# Patient Record
Sex: FEMALE | Race: WHITE | NOT HISPANIC OR LATINO | Employment: UNEMPLOYED | URBAN - METROPOLITAN AREA
[De-identification: names, ages, dates, MRNs, and addresses within clinical notes are randomized per-mention and may not be internally consistent; named-entity substitution may affect disease eponyms.]

---

## 2019-03-11 ENCOUNTER — OFFICE VISIT CONVERTED (OUTPATIENT)
Dept: FAMILY MEDICINE CLINIC | Facility: CLINIC | Age: 24
End: 2019-03-11
Attending: NURSE PRACTITIONER

## 2020-05-11 ENCOUNTER — HOSPITAL ENCOUNTER (OUTPATIENT)
Dept: FAMILY MEDICINE CLINIC | Facility: CLINIC | Age: 25
Discharge: HOME | End: 2020-05-11
Attending: NURSE PRACTITIONER

## 2020-05-11 ENCOUNTER — OFFICE VISIT CONVERTED (OUTPATIENT)
Dept: FAMILY MEDICINE CLINIC | Facility: CLINIC | Age: 25
End: 2020-05-11
Attending: NURSE PRACTITIONER

## 2021-05-13 NOTE — PROGRESS NOTES
Progress Note      Patient Name: luisito bailey   Patient ID: [id number]   Sex: Female   YOB: 1995    Primary Care Provider: Pranay Chapa DO    Visit Date: May 11, 2020    Provider: JUAN Hunter   Location: [facility]   Location Address: [address] [place] [address], [address] [place] [address]  [id number]   Location Phone: [phone]          Chief Complaint  · follow up on birth control  · Annual Exam  · PAP exam  · (Health Maintainence Information Reviewed Under Results)      History Of Present Illness  luisito bailey is a 24 year old /White female who presents for evaluation and treatment of:   Last PAP Smear: Never.   Date of Last Mammogram: N/A.   Date of Last Colonoscopy: N/A   No current complaints.      She is here today for follow-up and refill on oral contraceptive pills.  She has never had a Pap smear and is willing to do Pap today.  States her last menstrual period was about 2 weeks ago.  She denies ever being sexually active.  States she only takes oral contraceptives for heavy menses.       Past Medical History  Disease Name Date Onset Notes   Allergies --  --    Anxiety --  --          Past Surgical History  Procedure Name Date Notes   Cyst Removal --  --    Flanagan Tooth Extraction --  --          Medication List  Name Date Started Instructions   Loestrin Fe 1/20 (28-Day) 1 mg-20 mcg (21)/75 mg (7) oral tablet 05/11/2020 take 1 tablet by oral route once daily for 28 days         Allergy List  Allergen Name Date Reaction Notes   Bactrim --  --  --    PENICILLINS --  --  --    Septra --  --  --          Family Medical History  Disease Name Relative/Age Notes   Stroke  --    Heart Disease  --    Kidney Disease  --    Diabetes  --          Social History  Finding Status Start/Stop Quantity Notes   Tobacco Never --/-- --  --          Review of Systems  · Constitutional  o Denies  o : fever, fatigue, weight loss, weight gain  · Cardiovascular  o Denies  o : lower extremity  "edema, claudication, chest pressure, palpitations  · Respiratory  o Denies  o : shortness of breath, wheezing, cough, hemoptysis, dyspnea on exertion  · Gastrointestinal  o Denies  o : nausea, vomiting, diarrhea, constipation, abdominal pain  · Genitourinary  o Admits  o : vaginal discharge  o Denies  o : urgency, frequency, dysuria, possible pregnancy  · Psychiatric  o Denies  o : anxiety, depression, suicidal ideation, homicidal ideation      Vitals  Date Time BP Position Site L\R Cuff Size HR RR TEMP (F) WT  HT  BMI kg/m2 BSA m2 O2 Sat        05/11/2020 08:26 /60 Sitting    98 - R  98.2 184lbs 16oz 5'  9\" 27.32 2.02 97 %          Physical Examination  · Constitutional  o Appearance  o : well-nourished, in no acute distress  · Neck  o Inspection/Palpation  o : normal appearance, no masses or tenderness, trachea midline  o Thyroid  o : gland size normal, nontender, no nodules or masses present on palpation  · Respiratory  o Respiratory Effort  o : breathing unlabored  o Inspection of Chest  o : normal appearance  o Auscultation of Lungs  o : normal breath sounds throughout  · Cardiovascular  o Heart  o :   § Auscultation of Heart  § : regular rate and rhythm, no murmurs, gallops or rubs  · Breasts  o Inspection of Breasts  o : breasts symmetrical, no skin changes, no deformities present, no discharge present  o Palpation of Breasts, Axillae  o : no masses present on palpation, no breast tenderness  · Genitourinary  o External Genitalia  o : no inflammation, no lesions present  o Vagina  o : normal vaginal vault, white-colored discharge present, no inflammatory lesions present, no masses present, no evidence of trauma  o Cervix  o : cervix not visualized, patient unable to tolerate speculum and could not expand speculum  o Perineum  o : perineum within normal limits  · Lymphatic  o Neck  o : no lymphadenopathy present  o Axilla  o : no lymphadenopathy present  · Neurologic  o Mental Status Examination  o : "   § Orientation  § : grossly oriented to person, place and time  o Gait and Station  o : normal gait, able to stand without difficulty  · Psychiatric  o Judgement and Insight  o : judgment and insight intact  o Mood and Affect  o : mood normal, affect appropriate          Assessment  · Encounter for contraceptive management     V25.9/Z30.9  · Vaginal Discharge     623.5/N89.8  · Vaginal Itching     698.1/L29.8    Problems Reconciled  Plan  · Orders  o Vaginal Screen (Candida, Gardnerella, Trichomonas) (47472) - 623.5/N89.8 - 05/11/2020  o ACO-39: Current medications updated and reviewed () - - 05/11/2020  o ACO-18: Negative screen for clinical depression using a standardized tool () - - 05/11/2020   negative 4 pts.  o ACO-14: Influenza immunization administered or previously received () - - 05/11/2020   10/2019  · Medications  o Loestrin Fe 1/20 (28-Day) 1 mg-20 mcg (21)/75 mg (7) oral tablet   SIG: take 1 tablet by oral route once daily for 28 days   DISP: (28) tablets with 11 refills  Adjusted on 05/11/2020     o Medications have been Reconciled  o Transition of Care or Provider Policy  · Instructions  o Birth control pills need to be taken at the same time daily. Vomiting, diarrhea, antibiotics and seizure medication make these pills less effective. If any of these happen during a pack use other form of birth control until start a new pack. Break through bleeding is any bleeding during the active pills in the pack. If this occurs use another form of birth control. If you miss a pill or take one late use another form of birth control until you start a new pack. Abdominal Pain, Chest pain, headaches (not relieved by Tylenol), Leg pain or vision changes need to be reported immediately to your provider.   o Patient was educated/instructed on their diagnosis, treatment and medications prior to discharge from the clinic today.  o Patient instructed to seek medical attention urgently for new or worsening  symptoms.  o Call the office with any concerns or questions.  o Counseled on monthly breast self exams.   · Disposition  o Call or Return if symptoms worsen or persist.  o Annual wellness exam in one year     Unable to obtain Pap smear today due to patient complaining of pain with speculum insertion, unable to expand speculum.  She did have some vaginal discharge so a vaginal swab was obtained.             Electronically Signed by: JUNA Hunter -Author on May 11, 2020 10:55:25 AM

## 2021-05-15 VITALS
HEART RATE: 98 BPM | DIASTOLIC BLOOD PRESSURE: 60 MMHG | WEIGHT: 185 LBS | TEMPERATURE: 98.2 F | OXYGEN SATURATION: 97 % | BODY MASS INDEX: 27.4 KG/M2 | HEIGHT: 69 IN | SYSTOLIC BLOOD PRESSURE: 104 MMHG

## 2021-05-15 VITALS
DIASTOLIC BLOOD PRESSURE: 60 MMHG | OXYGEN SATURATION: 96 % | SYSTOLIC BLOOD PRESSURE: 110 MMHG | HEIGHT: 69 IN | WEIGHT: 185.25 LBS | HEART RATE: 86 BPM | BODY MASS INDEX: 27.44 KG/M2

## 2021-11-01 ENCOUNTER — OFFICE VISIT (OUTPATIENT)
Dept: FAMILY MEDICINE CLINIC | Facility: CLINIC | Age: 26
End: 2021-11-01

## 2021-11-01 VITALS
TEMPERATURE: 99.2 F | BODY MASS INDEX: 27.61 KG/M2 | WEIGHT: 186.4 LBS | OXYGEN SATURATION: 99 % | HEIGHT: 69 IN | DIASTOLIC BLOOD PRESSURE: 62 MMHG | HEART RATE: 94 BPM | SYSTOLIC BLOOD PRESSURE: 120 MMHG

## 2021-11-01 DIAGNOSIS — F41.9 ANXIETY: Primary | ICD-10-CM

## 2021-11-01 DIAGNOSIS — Z23 ENCOUNTER FOR VACCINATION: ICD-10-CM

## 2021-11-01 PROCEDURE — 90471 IMMUNIZATION ADMIN: CPT | Performed by: NURSE PRACTITIONER

## 2021-11-01 PROCEDURE — 99213 OFFICE O/P EST LOW 20 MIN: CPT | Performed by: NURSE PRACTITIONER

## 2021-11-01 PROCEDURE — 90686 IIV4 VACC NO PRSV 0.5 ML IM: CPT | Performed by: NURSE PRACTITIONER

## 2021-11-01 RX ORDER — NORETHINDRONE ACETATE AND ETHINYL ESTRADIOL AND FERROUS FUMARATE 1MG-20(21)
1 KIT ORAL DAILY
COMMUNITY
Start: 2021-10-11 | End: 2021-11-24

## 2021-11-01 NOTE — PROGRESS NOTES
"Chief Complaint  vaccination concerns    Subjective          Orly Hartmann presents to [facility]  History of Present Illness  She is here to discuss her vaccination concerns.  She is going to start a job as a  and they are requiring her to have the flu vaccine, the Covid vaccine and hepatitis A vaccines.  She has some general concerns about vaccines.  She is not sure if she has had a hepatitis A vaccine or not.  She states she does have her immunization record at home.  She would like to go ahead and get the flu vaccine today.    She has a history of anxiety.  She is noted to be picking at her nails the entire time during her visit.  She has never been treated for anxiety and has never seen a therapist.  She states her anxiety is \"ramped up\" recently.  She states she has thought about seeing a therapist.  Objective   Vital Signs:   /62   Pulse 94   Temp 99.2 °F (37.3 °C)   Ht 175.3 cm (69\")   Wt 84.6 kg (186 lb 6.4 oz)   SpO2 99%   BMI 27.53 kg/m²     Physical Exam  Vitals reviewed.   Constitutional:       Appearance: Normal appearance. She is well-developed.   Neck:      Thyroid: No thyroid mass, thyromegaly or thyroid tenderness.   Cardiovascular:      Rate and Rhythm: Normal rate and regular rhythm.      Heart sounds: No murmur heard.  No friction rub. No gallop.    Pulmonary:      Effort: Pulmonary effort is normal.      Breath sounds: Normal breath sounds. No wheezing or rhonchi.   Lymphadenopathy:      Cervical: No cervical adenopathy.   Skin:     General: Skin is warm and dry.   Neurological:      Mental Status: She is alert and oriented to person, place, and time.      Cranial Nerves: No cranial nerve deficit.   Psychiatric:         Mood and Affect: Mood and affect normal.         Behavior: Behavior normal.         Thought Content: Thought content normal. Thought content does not include homicidal or suicidal ideation.         Judgment: Judgment normal. "      Comments: Patient picking at nails during visit.  Eye contact is rare during visit.        Result Review :                 Assessment and Plan    Diagnoses and all orders for this visit:    1. Anxiety (Primary)  Assessment & Plan:  Anxiety worse recently.  Patient declines any interventions.  Recommend to see a therapist.      2. Encounter for vaccination  Comments:  Questions answered about vaccinations, written information provided, see AVS.  Patient received flu vaccine today.    Other orders  -     FluLaval/Fluarix/Fluzone >6 Months ([id number])      Follow Up   Return if symptoms worsen or fail to improve.  Patient was given instructions and counseling regarding her condition or for health maintenance advice. Please see specific information pulled into the AVS if appropriate.

## 2021-11-01 NOTE — PATIENT INSTRUCTIONS
Immunization Schedule, 22-26 Years Old    Vaccines are usually given at various ages, according to a schedule. Your health care provider will recommend vaccines for you based on your age, medical history, and lifestyle or other factors, such as travel or where you work.  You may receive vaccines as individual doses or as more than one vaccine together in one shot (combination vaccine). Talk with your health care provider about the risks and benefits of combination vaccines.  Recommended immunizations for 22-26 years old  Influenza vaccine  · You should get a dose of the influenza vaccine every year.  Tetanus, diphtheria, and pertussis vaccine  A vaccine that protects against tetanus, diphtheria, and pertussis is known as the Tdap vaccine. A vaccine that protects against tetanus and diphtheria is known as the Td vaccine.  · You should only get the Td vaccine if you have had at least 1 dose of the Tdap vaccine.  · You should get 1 dose of the Td or Tdap vaccine every 10 years, or you should get 1 dose of the Tdap vaccine if:  ? You have not previously gotten a Tdap vaccine.  ? You do not know if you have ever gotten a Tdap vaccine.  ? You are between 27 and 36 weeks pregnant.  Measles, mumps, and rubella vaccine  This is also known as the MMR vaccine. You may need to get the MMR vaccine if:  · You need to catch up on doses you missed in the past.  · You have not been given the vaccine before.  · You do not have evidence of immunity (by a blood test).  Pregnant women should not get the MMR vaccine during pregnancy because it may be harmful to the unborn baby. However, if you are not immune to measles, mumps, or rubella, you should get a dose of MMR vaccine one month or more before pregnancy or within days after delivery.  Varicella vaccine  This is also known as the NELLI vaccine. You may need to get the NELLI vaccine if:  · You need to catch up on doses you missed in the past.  · You have not been given the vaccine  before.  · You do not have evidence of immunity (by a blood test).  · You have certain high-risk conditions, such as HIV or AIDS.  Pregnant women should not get the NELLI vaccine during pregnancy because it may be harmful to the unborn baby. However, if you are not immune to chickenpox (varicella), you should get a dose of the NELLI vaccine within days after delivery.  Human papillomavirus vaccine  This is also known as the HPV vaccine. You should get the HPV vaccine if:  · You have not gotten the vaccine before. The vaccine is recommended for all adults through age 26.  · You need to catch up on doses you missed in the past.  Pneumococcal conjugate vaccine  This is also known as the PCV13 vaccine. You should get the PCV13 vaccine as recommended if you have certain high-risk conditions. These include:  · Diabetes.  · Chronic conditions of the heart, lungs, or liver.  · Conditions that affect the body's disease-fighting system (immune system).  Pneumococcal polysaccharide vaccine  This is also known as the PPSV23 vaccine. You should get the PPSV23 vaccine as recommended if you have certain high-risk conditions. These include:  · Diabetes.  · Chronic conditions of the heart, lungs, or liver.  · Conditions that affect the immune system.  Hepatitis A vaccine  This is also known as the HepA vaccine. If you did not get the HepA vaccine previously, you should get it if:  · You are at risk for a hepatitis A infection. You may be at risk for infection if you:  ? Have chronic liver disease.  ? Have HIV or AIDS.  ? Are a man who has sex with men.  ? Use drugs.  ? Are homeless.  ? May be exposed to hepatitis A through work.  ? Travel to countries where hepatitis A is common.  ? Are pregnant.  ? Have or will have close contact with someone who was adopted from another country.  · You are not at risk for infection but want protection from hepatitis A.  Hepatitis B vaccine  This is also known as the HepB vaccine. If you did not get  the HepB vaccine previously, you should get it if:  · You are at risk for hepatitis B infection. You are at risk if you:  ? Have chronic liver disease.  ? Have HIV or AIDS.  ? Have sex with a partner who has hepatitis B, or:  § You have multiple sex partners.  § You are a man who has sex with men.  ? Use drugs.  ? May be exposed to hepatitis B through work.  ? Live with someone who has hepatitis B.  ? Receive dialysis treatment.  ? Have diabetes.  ? Travel to countries where hepatitis B is common.  ? Are pregnant.  · You are not at risk of infection but want protection from hepatitis B.  Meningococcal conjugate vaccine  This is also known as the MenACWY vaccine. You may need to get the MenACWY vaccine if you:  · Have not been given the vaccine before.  · Need to catch up on doses you missed in the past.  This vaccine is especially important if you:  · Do not have a spleen.  · Have sickle cell disease.  · Have HIV.  · Take medicines that suppress your immune system.  · Travel to countries where meningococcal disease is common.  · Are exposed to Neisseria meningitidis at work.  Serogroup B meningococcal vaccine  This is also known as the MenB vaccine. You may need to get the MenB vaccine if you:  · Have not been given the vaccine before.  · Need to catch up on doses you missed in the past.  This vaccine is especially important if you:  · Do not have a spleen.  · Have sickle cell disease.  · Take medicines that suppress your immune system.  · Are exposed to Neisseria meningitidis at work.  Haemophilus influenzae type b vaccine  This is also known as the Hib vaccine. Anyone older than 5 years of age is usually not given the Hib vaccine. However, if you have certain high-risk conditions, you may need to get this vaccine. These conditions include:  · Not having a spleen.  · Having received a stem cell transplant.  Before you get a vaccine:  Talk with your health care provider about which vaccines are right for you. This  is especially important if:  · You previously had a reaction after getting a vaccine.  · You have a weakened immune system. You may have a weakened immune system if you:  ? Are taking medicines that reduce (suppress) the activity of your immune system.  ? Are taking medicines to treat cancer (chemotherapy).  ? Have HIV or AIDS.  · You work in an environment where you may be exposed to a disease.  · You plan to travel outside of the country.  · You have a chronic illness, such as heart disease, kidney disease, diabetes, or lung disease.  · You are pregnant, think you may be pregnant, or are planning to become pregnant.  Summary  · Before you get a vaccine, tell your health care provider if you have reacted to vaccines in the past or have a condition that weakens your immune system.  · At 22-26 years, you should get a dose of the flu vaccine every year and a dose of the Td or Tdap vaccine every 10 years.  · Depending on your medical history and your risk factors, you may need other vaccines. Ask your health care provider whether you are up to date on all your vaccines.  · Women who are pregnant may not receive certain vaccines. Ask your health care provider whether you should receive any vaccines soon after you deliver your baby.  This information is not intended to replace advice given to you by your health care provider. Make sure you discuss any questions you have with your health care provider.  Document Revised: 10/13/2020 Document Reviewed: 10/13/2020  [employer] Patient Education © 2021 [employer]  Influenza (Flu) Vaccine (Inactivated or Recombinant): What You Need to Know  1. Why get vaccinated?  Influenza vaccine can prevent influenza (flu).  Flu is a contagious disease that spreads around the [place] every year, usually between October and May. Anyone can get the flu, but it is more dangerous for some people. Infants and young children, people 65 years of age and older, pregnant women, and people with  certain health conditions or a weakened immune system are at greatest risk of flu complications.  Pneumonia, bronchitis, sinus infections and ear infections are examples of flu-related complications. If you have a medical condition, such as heart disease, cancer or diabetes, flu can make it worse.  Flu can cause fever and chills, sore throat, muscle aches, fatigue, cough, headache, and runny or stuffy nose. Some people may have vomiting and diarrhea, though this is more common in children than adults.  Each year thousands of people in the [place] die from flu, and many more are hospitalized. Flu vaccine prevents millions of illnesses and flu-related visits to the doctor each year.  2. Influenza vaccine  CDC recommends everyone 6 months of age and older get vaccinated every flu season. Children 6 months through 8 years of age may need 2 doses during a single flu season. Everyone else needs only 1 dose each flu season.  It takes about 2 weeks for protection to develop after vaccination.  There are many flu viruses, and they are always changing. Each year a new flu vaccine is made to protect against three or four viruses that are likely to cause disease in the upcoming flu season. Even when the vaccine doesn't exactly match these viruses, it may still provide some protection.  Influenza vaccine does not cause flu.  Influenza vaccine may be given at the same time as other vaccines.  3. Talk with your health care provider  Tell your vaccine provider if the person getting the vaccine:  · Has had an allergic reaction after a previous dose of influenza vaccine, or has any severe, life-threatening allergies.  · Has ever had Guillain-Barré Syndrome (also called GBS).  In some cases, your health care provider may decide to postpone influenza vaccination to a future visit.  People with minor illnesses, such as a cold, may be vaccinated. People who are moderately or severely ill should usually wait until they recover  before getting influenza vaccine.  Your health care provider can give you more information.  4. Risks of a vaccine reaction  · Soreness, redness, and swelling where shot is given, fever, muscle aches, and headache can happen after influenza vaccine.  · There may be a very small increased risk of Guillain-Barré Syndrome (GBS) after inactivated influenza vaccine (the flu shot).  Young children who get the flu shot along with pneumococcal vaccine (PCV13), and/or DTaP vaccine at the same time might be slightly more likely to have a seizure caused by fever. Tell your health care provider if a child who is getting flu vaccine has ever had a seizure.  People sometimes faint after medical procedures, including vaccination. Tell your provider if you feel dizzy or have vision changes or ringing in the ears.  As with any medicine, there is a very remote chance of a vaccine causing a severe allergic reaction, other serious injury, or death.  5. What if there is a serious problem?  An allergic reaction could occur after the vaccinated person leaves the clinic. If you see signs of a severe allergic reaction (hives, swelling of the face and throat, difficulty breathing, a fast heartbeat, dizziness, or weakness), call 9-1-1 and get the person to the nearest hospital.  For other signs that concern you, call your health care provider.  Adverse reactions should be reported to the Vaccine Adverse Event Reporting System (VAERS). Your health care provider will usually file this report, or you can do it yourself. Visit the VAERS website at www.vaers.hhs.gov or call 1-879.334.9631. VAERS is only for reporting reactions, and VAERS staff do not give medical advice.  6. The National Vaccine Injury Compensation Program  The National Vaccine Injury Compensation Program (VICP) is a federal program that was created to compensate people who may have been injured by certain vaccines. Visit the VICP website at www.hrsa.gov/vaccinecompensation or  call 1-547.138.7797 to learn about the program and about filing a claim. There is a time limit to file a claim for compensation.  7. How can I learn more?  · Ask your healthcare provider.  · Call your local or state health department.  · Contact the Centers for Disease Control and Prevention (CDC):  ? Call 1-380.191.4929 (5-002-JTS-INFO) or  ? Visit CDC's www.cdc.gov/flu  Vaccine Information Statement (Interim) Inactivated Influenza Vaccine (8/15/2019)  This information is not intended to replace advice given to you by your health care provider. Make sure you discuss any questions you have with your health care provider.  Document Revised: 12/09/2020 Document Reviewed: 12/09/2020  Elsevier Patient Education © 2021 [employer]  Frequently Asked Questions About COVID-19 Vaccination  · Below are answers to commonly asked questions about COVID-19 vaccination.  · Bust myths and learn the facts about COVID-19 vaccines  If I have already had COVID-19 and recovered, do I still need to get vaccinated with a COVID-19 vaccine?  Yes, you should be vaccinated regardless of whether you already had COVID-19. That's because experts do not yet know how long you are protected from getting sick again after recovering from COVID-19. Even if you have already recovered from COVID-19, it is possible--although rare--that you could be infected with the virus that causes COVID-19 again. Studies have shown that vaccination provides a strong boost in protection in people who have recovered from COVID-19. Learn more about why getting vaccinated is a safer way to build protection than getting infected.  If you were treated for COVID-19 with monoclonal antibodies or convalescent plasma, you should wait 90 days before getting a COVID-19 vaccine. Talk to your doctor if you are unsure what treatments you received or if you have more questions about getting a COVID-19 vaccine.  If you or your child has a history of multisystem inflammatory syndrome  in adults or children (MIS-A or MIS-C), consider delaying vaccination until you or your child have recovered from being sick and for 90 days after the date of diagnosis of MIS-A or MIS-C. Learn more about the clinical considerations people with a history of multisystem MIS-C or MIS-A.  Experts are still learning more about how long vaccines protect against COVID-19. [facility] will keep the public informed as new evidence becomes available.  Related pages:  · Benefits of Getting Vaccinated  · Preparing for Your COVID-19 Vaccination  Is it safe for my child to get a COVID-19 vaccine?  Yes. Studies show that COVID-19 vaccines are safe and effective. Like adults, children may have some side effects after COVID-19 vaccination. These side effects may affect their ability to do daily activities, but they should go away in a few days. Children 12 years and older are now eligible to get vaccinated against COVID-19. COVID-19 vaccines have been used under the most intensive safety monitoring in [place] history, including studies in children 12 years and older. Your child cannot get COVID-19 from any COVID-19 vaccine.  Related page:  · COVID-19 Vaccines for Children and Teens  Why should my child get vaccinated against COVID-19?  COVID-19 vaccination can help protect your child from getting COVID-19. Although fewer children have been sick with COVID-19 compared to adults, children can be infected with the virus that causes COVID-19, can get sick from COVID-19, and can spread the virus that causes COVID-19 to others. Getting your child vaccinated helps to protect your child and your family. Vaccination is now recommended for everyone 12 years and older. Currently, the Pfizer-BioNTech COVID-19 Vaccine is the only one available to children 12 years and older.  Related page:  · COVID-19 Vaccines for Children and Teens  What are the ingredients in COVID-19 vaccines?  Vaccine ingredients can vary by . To learn more about the  ingredients in authorized COVID-19 vaccines, see  · Information about the Pfizer-BioNTech COVID-19 Vaccine  · Information about the Moderna COVID-19 Vaccine  · Information about the [employer]'s Pito COVID-19 Vaccine  · Ingredients Included in COVID-19 Vaccines  Do I need to wear a mask and avoid close contact with others if I am fully vaccinated?  No. Fully vaccinated people can resume activities without wearing a mask or physically distancing, except where required by federal, state, local, [place], or territorial laws, rules, and regulations, including local business and workplace guidance. If you are fully vaccinated, you can resume activities that you did before the pandemic.  Additional recommendations can be found at When You've Been Fully Vaccinated.  Related pages:  · When You've Been Fully Vaccinated  · Key Things to Know  · Protect Yourself and Others  Can I choose which COVID-19 vaccine I get?  Yes. All currently authorized and recommended COVID-19 vaccines are safe and effective, and CDC does not recommend one vaccine over another. The most important decision is to get a COVID-19 vaccination as soon as possible. Widespread vaccination is a critical tool to help stop the pandemic.  People should be aware that a risk of a rare condition called thrombosis with thrombocytopenia syndrome (TTS) has been reported following vaccination with the Znode/Pito COVID-19 Vaccine. TTS is a serious condition that involves blood clots with low platelet counts. This problem is rare, and most reports were in women between 18 and 49 years old. For women 50 years and older and men of any age, this problem is even more rare. There are other COVID-19 vaccine options available for which this risk has not been seen (Pfizer-BioNTech, Moderna).  Learn more about your COVID-19 vaccination, including how to find a vaccination location, what to expect at your appointment, and more.  Related page:  · Your Vaccination  If I  am pregnant, can I get a COVID-19 vaccine?  Yes, if you are pregnant, you can receive a COVID-19 vaccine.  You might want to have a conversation with your healthcare provider to help you decide whether to get vaccinated. While such a conversation might be helpful, it is not required before vaccination. Learn more about vaccination considerations for people who are pregnant or breastfeeding.  If you are pregnant and have received a COVID-19 vaccine, we encourage you to enroll in [employer] smartphone-based tool that provides personalized health check-ins after vaccination. A [employer] pregnancy registry has been established to gather information on the health of pregnant people who have received a COVID-19 vaccine.  Related pages:  · COVID-19 Vaccines for Pregnant or Breastfeeding People  · Monitoring Systems for Pregnant People  · [employer] Pregnancy Registry  How long does protection from a COVID-19 vaccine last?  We don't know how long protection lasts for those who are vaccinated. What we do know is that COVID-19 has caused very serious illness and death for a lot of people. If you get COVID-19, you also risk giving it to loved ones who may get very sick. Getting a COVID-19 vaccine is a safer choice.  Experts are working to learn more about both natural immunity and vaccine-induced immunity. [facility] will keep the public informed as new evidence becomes available.  Related page:  · Vaccines Work  How many doses of COVID-19 vaccine will I need to get?  The number of doses needed depends on which vaccine you receive. To get the most protection:  · Two Pfizer-BioNTech vaccine doses should be given 3 weeks (21 days) apart.  · Two Moderna vaccine doses should be given 1 month (28 days) apart.  · [employer] (J&J/B2B-Center) COVID-19 vaccine requires only one dose.  If you receive a vaccine that requires two doses, you should get your second shot as close to the recommended interval as possible. However, your second  dose may be given up to 6 weeks (42 days) after the first dose, if necessary.. You should not get the second dose earlier than the recommended interval.  Related pages:  · Pfizer-BioNTech  · Moderna  · Dwayne [employer] Dwayne / Pito  If I have an underlying condition, can I get a COVID-19 vaccine?  People with underlying medical conditions can receive a COVID-19 vaccine as long as they have not had an immediate or severe allergic reaction to a COVID-19 vaccine or to any of the ingredients in the vaccine. Learn more about vaccination considerations for people with underlying medical conditions. Vaccination is an important consideration for adults of any age with certain underlying medical conditionsbecause they are at increased risk for severe illness from COVID-19.  Related pages:  · Underlying Medical Conditions  · People at High Risk  · People with Allergies  Can I get vaccinated against COVID-19 while I am currently sick with COVID-19?  No. People with COVID-19 who have symptoms should wait to be vaccinated until they have recovered from their illness and have met the criteria for discontinuing isolation; those without symptoms should also wait until they meet the criteria before getting vaccinated. This guidance also applies to people who get COVID-19 before getting their second dose of vaccine.  Related pages:  · When to Quarantine  · Ending Home Isolation  Answers to more questions about:  · Healthcare Professionals and COVID-19 Vaccines  · Vaccines.gov  · Vaccine Administration Management System (VAMS)  · COVID-19 Vaccination in Long-term Care Facilities  · V-safe after Vaccination Health   06/15/2021  Content source: National Center for Immunization and Respiratory Diseases (NCIRD), Division of Viral Diseases  This information is not intended to replace advice given to you by your health care provider. Make sure you discuss any questions you have with your health care provider.  Document Revised:  08/04/2021 Document Reviewed: 08/04/2021  [employer] Patient Education © 2021 [employer].  Hepatitis A Vaccine, Inactivated suspension for injection  What is this medicine?  HEPATITIS A VACCINE (hep uh SULMA MONCADAKRISTINA juan ramon) is a vaccine to protect from an infection with the hepatitis A virus. This vaccine does not contain the live virus. It will not cause a hepatitis infection. This vaccine is also used with immunoglobulin to prevent infection in people who have been exposed to hepatitis A.  This medicine may be used for other purposes; ask your health care provider or pharmacist if you have questions.  COMMON BRAND NAME(S): Havrix, Vaqta  What should I tell my health care provider before I take this medicine?  They need to know if you have any of these conditions:  · bleeding disorder  · fever or infection  · heart disease  · immune system problems  · an unusual or allergic reaction to hepatitis A vaccine, latex, neomycin, other medicines, foods, dyes, or preservatives  · pregnant or trying to get pregnant  · breast-feeding  How should I use this medicine?  This vaccine is for injection into a muscle. It is given by a health care professional.  A copy of Vaccine Information Statements will be given before each vaccination. Read this sheet carefully each time. The sheet may change frequently.  Talk to your pediatrician regarding the use of this medicine in children. While this drug may be prescribed for children as young as 12 months of age for selected conditions, precautions do apply.  Overdosage: If you think you have taken too much of this medicine contact a poison control center or emergency room at once.  NOTE: This medicine is only for you. Do not share this medicine with others.  What if I miss a dose?  This does not apply.  What may interact with this medicine?  · medicines to treat cancer  · medicines that suppress your immune function like adalimumab, anakinra, etanercept, infliximab  · steroid medicines  like prednisone or cortisone  This list may not describe all possible interactions. Give your health care provider a list of all the medicines, herbs, non-prescription drugs, or dietary supplements you use. Also tell them if you smoke, drink alcohol, or use illegal drugs. Some items may interact with your medicine.  What should I watch for while using this medicine?  See your health care provider for a booster shot of this vaccine as directed. Tell your doctor right away if you have any serious or unusual side effects after getting this vaccine.  You will not have protection from the hepatitis A virus for at least 8 to 10 days after your first injection. The length of time you will have protection from hepatitis A virus infection is not known. Check with your doctor if you have questions about your immunity. See your doctor before you travel out of the country.  What side effects may I notice from receiving this medicine?  Side effects that you should report to your doctor or health care professional as soon as possible:  · allergic reactions like skin rash, itching or hives, swelling of the face, lips, or tongue  · breathing problems  · seizures  · yellowing of the eyes or skin  Side effects that usually do not require medical attention (report to your doctor or health care professional if they continue or are bothersome):  · diarrhea  · fever  · loss of appetite  · muscle pain  · nausea  · pain, redness, swelling or irritation at site where injected  · tiredness  This list may not describe all possible side effects. Call your doctor for medical advice about side effects. You may report side effects to FDA at 7-803-FDA-6383.  Where should I keep my medicine?  This drug is given in a hospital or clinic and will not be stored at home.  NOTE: This sheet is a summary. It may not cover all possible information. If you have questions about this medicine, talk to your doctor, pharmacist, or health care provider.  © 2021  Elsevier/Gold Standard (2015-04-20 13:19:40)

## 2021-12-14 ENCOUNTER — OFFICE VISIT (OUTPATIENT)
Dept: FAMILY MEDICINE CLINIC | Facility: CLINIC | Age: 26
End: 2021-12-14

## 2021-12-14 VITALS
HEIGHT: 70 IN | OXYGEN SATURATION: 97 % | TEMPERATURE: 98.4 F | HEART RATE: 79 BPM | BODY MASS INDEX: 25.77 KG/M2 | WEIGHT: 180 LBS | DIASTOLIC BLOOD PRESSURE: 60 MMHG | SYSTOLIC BLOOD PRESSURE: 114 MMHG

## 2021-12-14 DIAGNOSIS — J01.00 ACUTE NON-RECURRENT MAXILLARY SINUSITIS: Primary | ICD-10-CM

## 2021-12-14 DIAGNOSIS — J02.9 SORE THROAT: ICD-10-CM

## 2021-12-14 DIAGNOSIS — R05.9 COUGH: ICD-10-CM

## 2021-12-14 LAB
EXPIRATION DATE: NORMAL
EXPIRATION DATE: NORMAL
FLUAV AG NPH QL: NEGATIVE
FLUBV AG NPH QL: NEGATIVE
INTERNAL CONTROL: NORMAL
INTERNAL CONTROL: NORMAL
Lab: NORMAL
Lab: NORMAL
S PYO AG THROAT QL: NEGATIVE

## 2021-12-14 PROCEDURE — 99213 OFFICE O/P EST LOW 20 MIN: CPT | Performed by: FAMILY MEDICINE

## 2021-12-14 PROCEDURE — 87880 STREP A ASSAY W/OPTIC: CPT | Performed by: FAMILY MEDICINE

## 2021-12-14 PROCEDURE — U0004 COV-19 TEST NON-CDC HGH THRU: HCPCS | Performed by: FAMILY MEDICINE

## 2021-12-14 PROCEDURE — 87804 INFLUENZA ASSAY W/OPTIC: CPT | Performed by: FAMILY MEDICINE

## 2021-12-14 RX ORDER — DOXYCYCLINE 100 MG/1
100 TABLET ORAL 2 TIMES DAILY
Qty: 14 TABLET | Refills: 0 | Status: SHIPPED | OUTPATIENT
Start: 2021-12-14 | End: 2021-12-21

## 2021-12-14 NOTE — PROGRESS NOTES
"Chief Complaint  Sore Throat and Cough    Subjective          Orly Hartmann presents to [facility]  History of Present Illness  Patient presents today for an acute visit.  Is accompanied by her sister, Oumou.  Patient was seen on 11/24/2021 for upper respiratory infection by urgent care and given azithromycin.  She reports that she felt better after treatment.  She was tested for Covid which was negative.  She reports that her father tested positive for Covid at that time.  She was in contact with him however neither her nor her siblings or mother got sick with Covid.  He has since recovered.  She reports that 2 days ago she started having a sore throat again, cough and runny nose.  She denies any loss of taste or smell.  She denies any fever chills.  She was concerned given new onset of symptoms again after a brief period of symptoms just recently as well as her father testing positive for Covid.  Objective   Vital Signs:   /60   Pulse 79   Temp 98.4 °F (36.9 °C)   Ht 177.8 cm (70\")   Wt 81.6 kg (180 lb)   SpO2 97%   BMI 25.83 kg/m²     Physical Exam   General: AAO ×3, no acute distress, pleasant  HEENT: Normocephalic, atraumatic, nasal congestion noted left worse than right with maxillary sinus tenderness to palpation left worse than right, there is oropharyngeal erythema with postnasal drip, tonsils are within normal limits 1+, there is no cervical tenderness lymphadenopathy, TM intact bilaterally with no erythema or evidence of perforation.  Cardiovascular: Regular rate and rhythm without appreciable murmur  Respiratory: Clear to auscultation bilaterally no RRW  Gastrointestinal: Soft nontender nondistended with bowel sounds present  extremities: No edema  Neurologic: CN II through XII grossly intact   Psychiatric: Normal mood and affect  Result Review :                 Assessment and Plan    Diagnoses and all orders for this visit:    1. Acute non-recurrent " maxillary sinusitis (Primary)    2. Sore throat  -     POCT rapid strep A    3. Cough  -     POCT Influenza A/B  -     Cancel: COVID-19,APTIMA PANTHER(LIONEL),[facility], NP/OP SWAB IN UTM/VTM/SALINE TRANSPORT MEDIA,24 HR TAT - Swab, Nasopharynx; Future  -     COVID-19,APTIMA PANTHER(LIONEL), MICHELLE/[facility], NP/OP SWAB IN UTM/VTM/SALINE TRANSPORT MEDIA,24 HR TAT - Swab, Nasopharynx    Other orders  -     doxycycline (ADOXA) 100 MG tablet; Take 1 tablet by mouth 2 (Two) Times a Day for 7 days.  Dispense: 14 tablet; Refill: 0    I discussed with patient treatment for sinusitis.  I discussed having her quarantine at home until results return.  I discussed with her staying well-hydrated.  I will place her on doxycycline.  Covid test is pending.    Follow Up   Return if symptoms worsen or fail to improve.  Patient was given instructions and counseling regarding her condition or for health maintenance advice. Please see specific information pulled into the AVS if appropriate.

## 2021-12-15 LAB — SARS-COV-2 RNA PNL SPEC NAA+PROBE: NOT DETECTED

## 2022-01-17 ENCOUNTER — OFFICE VISIT (OUTPATIENT)
Dept: FAMILY MEDICINE CLINIC | Facility: CLINIC | Age: 27
End: 2022-01-17

## 2022-01-17 VITALS
BODY MASS INDEX: 26.81 KG/M2 | DIASTOLIC BLOOD PRESSURE: 82 MMHG | HEART RATE: 86 BPM | WEIGHT: 176.9 LBS | TEMPERATURE: 98.5 F | OXYGEN SATURATION: 97 % | SYSTOLIC BLOOD PRESSURE: 116 MMHG | HEIGHT: 68 IN

## 2022-01-17 DIAGNOSIS — S89.91XA INJURY OF RIGHT SHIN, INITIAL ENCOUNTER: ICD-10-CM

## 2022-01-17 DIAGNOSIS — M79.675 PAIN OF TOE OF LEFT FOOT: ICD-10-CM

## 2022-01-17 DIAGNOSIS — S50.02XA CONTUSION OF LEFT ELBOW, INITIAL ENCOUNTER: ICD-10-CM
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
## 2022-01-17 DIAGNOSIS — S80.02XA CONTUSION OF LEFT KNEE, INITIAL ENCOUNTER: ICD-10-CM

## 2022-01-17 PROCEDURE — 99213 OFFICE O/P EST LOW 20 MIN: CPT | Performed by: FAMILY MEDICINE

## 2022-01-17 RX ORDER — ASPIRIN 81 MG/1
81 TABLET, CHEWABLE ORAL DAILY
COMMUNITY
End: 2022-04-25

## 2022-01-17 NOTE — PROGRESS NOTES
"Chief Complaint  Toe Pain (left -swollen and bruise), Leg Pain (right bruising), and Elbow Injury (left bruising)   Left knee pain      Subjective          Orly Hartmann presents to [facility]  History of Present Illness  Patient presents today for an acute visit.  She accompanied by her mother, Jasmine.  She presents today after falling last night in her bathtub.  She was going to grab a towel and somehow lost her balance and fell forward out of the tub.  She has the most pain in the second digit of her left toe.  She points specifically to the PIP joint of the second digit.  The first digit hurts a little bit but not as much.  She also hit her left knee and has a bruise on her right shin and her left elbow area.  She does have good range of motion of her left knee.  She has good range of motion of her left elbow.  She reports her main concern has been her second digit of the left toe.  Objective   Vital Signs:   /82   Pulse 86   Temp 98.5 °F (36.9 °C)   Ht 172.7 cm (68\")   Wt 80.2 kg (176 lb 14.4 oz)   SpO2 97%   BMI 26.90 kg/m²     Physical Exam   General: AAO ×3, no acute distress, pleasant  Musculoskeletal: Patient has a contusion of her left elbow just proximal and medial to the left elbow joint.  She has good range of motion of her left elbow.  The left knee has good range of motion with a contusion noted just distal to the left knee joint.  She also has a contusion on the right shin.  The second digit of left toe does have ecchymosis and is swollen and she cannot go through typical range of motion for the PIP joint of the second digit of the left foot.  She does have good range of motion of the DIP joint of the second digit of the left foot and good range of motion of the interphalangeal joint of the first digit of the left foot.  She has good range of motion of the metatarsophalangeal joint of the first digit of the left foot.  Result Review :               "   Assessment and Plan    Diagnoses and all orders for this visit:    1. Fall, initial encounter (Primary)    2. Pain of toe of left foot, 2nd digit  -     XR Foot 3+ View Left; Future    3. Contusion of left knee, initial encounter    4. Injury of right shin, initial encounter    5. Contusion of left elbow, initial encounter    Patient presenting from a fall.  She has contusions present which I suspect will heal over time.  I did discuss icing for no more than 15 minutes at a time and using ibuprofen to help out with pain and inflammation.  I discussed getting x-ray of her left foot for further evaluation as I am concerned about possible fracture.  We have shari taped her toe today.  Further recommendations to follow once results return.    Follow Up   Return if symptoms worsen or fail to improve.  Patient was given instructions and counseling regarding her condition or for health maintenance advice. Please see specific information pulled into the AVS if appropriate.

## 2022-03-15 ENCOUNTER — OFFICE VISIT (OUTPATIENT)
Dept: FAMILY MEDICINE CLINIC | Facility: CLINIC | Age: 27
End: 2022-03-15

## 2022-03-15 VITALS
BODY MASS INDEX: 27.04 KG/M2 | DIASTOLIC BLOOD PRESSURE: 64 MMHG | SYSTOLIC BLOOD PRESSURE: 102 MMHG | TEMPERATURE: 98.6 F | HEIGHT: 68 IN | OXYGEN SATURATION: 96 % | WEIGHT: 178.4 LBS | HEART RATE: 97 BPM

## 2022-03-15 DIAGNOSIS — J30.2 SEASONAL ALLERGIES: Primary | ICD-10-CM

## 2022-03-15 PROCEDURE — 99213 OFFICE O/P EST LOW 20 MIN: CPT | Performed by: NURSE PRACTITIONER

## 2022-03-15 RX ORDER — CETIRIZINE HYDROCHLORIDE 10 MG/1
10 TABLET ORAL NIGHTLY
Qty: 90 TABLET | Refills: 1 | Status: SHIPPED | OUTPATIENT
Start: 2022-03-15 | End: 2022-03-15 | Stop reason: SDUPTHER

## 2022-03-15 RX ORDER — CETIRIZINE HYDROCHLORIDE 10 MG/1
10 TABLET ORAL NIGHTLY
Qty: 90 TABLET | Refills: 1 | Status: SHIPPED | OUTPATIENT
Start: 2022-03-15

## 2022-03-15 RX ORDER — FLUTICASONE PROPIONATE 50 MCG
2 SPRAY, SUSPENSION (ML) NASAL DAILY
Qty: 16 G | Refills: 5 | Status: SHIPPED | OUTPATIENT
Start: 2022-03-15

## 2022-03-15 RX ORDER — FLUTICASONE PROPIONATE 50 MCG
2 SPRAY, SUSPENSION (ML) NASAL DAILY
Qty: 16 G | Refills: 5 | Status: SHIPPED | OUTPATIENT
Start: 2022-03-15 | End: 2022-03-15 | Stop reason: SDUPTHER

## 2022-03-15 NOTE — PROGRESS NOTES
"Chief Complaint  Sinus Problem and Earache (Bilateral ears/)    Subjective          Orly Hartmann presents to [facility]  History of Present Illness   26-year-old female presents today for an acute visit.  She is complaining of sinus congestion and bilateral earache for the past week.  She has tried taking over-the-counter Tylenol and she has used some over-the-counter eardrops without any good relief.  She complains of pressure and stuffiness in her sinuses.  Objective   Vital Signs:   /64   Pulse 97   Temp 98.6 °F (37 °C)   Ht 172.7 cm (68\")   Wt 80.9 kg (178 lb 6.4 oz)   SpO2 96%   BMI 27.13 kg/m²     Physical Exam  Vitals reviewed.   Constitutional:       Appearance: Normal appearance. She is well-developed.   HENT:      Right Ear: Ear canal and external ear normal. A middle ear effusion is present.      Left Ear: Ear canal and external ear normal. A middle ear effusion is present.      Mouth/Throat:      Mouth: Mucous membranes are moist.      Pharynx: No pharyngeal swelling, oropharyngeal exudate or posterior oropharyngeal erythema.      Comments: Postnasal drainage noted.  Neck:      Thyroid: No thyroid mass, thyromegaly or thyroid tenderness.   Cardiovascular:      Rate and Rhythm: Normal rate and regular rhythm.      Heart sounds: No murmur heard.    No friction rub. No gallop.   Pulmonary:      Effort: Pulmonary effort is normal.      Breath sounds: Normal breath sounds. No wheezing or rhonchi.   Lymphadenopathy:      Cervical: No cervical adenopathy.   Skin:     General: Skin is warm and dry.   Neurological:      Mental Status: She is alert and oriented to person, place, and time.      Cranial Nerves: No cranial nerve deficit.   Psychiatric:         Mood and Affect: Mood and affect normal.         Behavior: Behavior normal.         Thought Content: Thought content normal. Thought content does not include homicidal or suicidal ideation.         Judgment: " Judgment normal.        Result Review :                 Assessment and Plan    Diagnoses and all orders for this visit:    1. Seasonal allergies (Primary)  Assessment & Plan:  I will start her on Zyrtec nightly and Flonase nasal spray.  Advised that if she does not improve or any worsening symptoms to follow-up.      Other orders  -     Discontinue: cetirizine (zyrTEC) 10 MG tablet; Take 1 tablet by mouth Every Night.  Dispense: 90 tablet; Refill: 1  -     Discontinue: fluticasone (Flonase) 50 MCG/ACT nasal spray; 2 sprays into the nostril(s) as directed by provider Daily.  Dispense: 16 g; Refill: 5      Follow Up   Return if symptoms worsen or fail to improve.  Patient was given instructions and counseling regarding her condition or for health maintenance advice. Please see specific information pulled into the AVS if appropriate.

## 2022-03-15 NOTE — ASSESSMENT & PLAN NOTE
I will start her on Zyrtec nightly and Flonase nasal spray.  Advised that if she does not improve or any worsening symptoms to follow-up.

## 2022-04-19 RX ORDER — NORETHINDRONE ACETATE AND ETHINYL ESTRADIOL AND FERROUS FUMARATE 1MG-20(21)
KIT ORAL
Qty: 28 TABLET | Refills: 5 | Status: SHIPPED | OUTPATIENT
Start: 2022-04-19 | End: 2022-04-25 | Stop reason: SDUPTHER

## 2022-04-25 ENCOUNTER — OFFICE VISIT (OUTPATIENT)
Dept: FAMILY MEDICINE CLINIC | Facility: CLINIC | Age: 27
End: 2022-04-25

## 2022-04-25 VITALS
WEIGHT: 176.4 LBS | BODY MASS INDEX: 25.25 KG/M2 | HEIGHT: 70 IN | HEART RATE: 88 BPM | SYSTOLIC BLOOD PRESSURE: 102 MMHG | OXYGEN SATURATION: 99 % | TEMPERATURE: 97.5 F | DIASTOLIC BLOOD PRESSURE: 58 MMHG

## 2022-04-25 DIAGNOSIS — Z00.00 ANNUAL PHYSICAL EXAM: Primary | ICD-10-CM

## 2022-04-25 DIAGNOSIS — N94.6 DYSMENORRHEA: ICD-10-CM

## 2022-04-25 DIAGNOSIS — Z11.59 NEED FOR HEPATITIS C SCREENING TEST: ICD-10-CM

## 2022-04-25 DIAGNOSIS — Z83.49 FAMILY HISTORY OF THYROID DISEASE IN FATHER: ICD-10-CM

## 2022-04-25 PROCEDURE — 99395 PREV VISIT EST AGE 18-39: CPT | Performed by: NURSE PRACTITIONER

## 2022-04-25 RX ORDER — NORETHINDRONE ACETATE AND ETHINYL ESTRADIOL 1MG-20(21)
1 KIT ORAL DAILY
Qty: 84 TABLET | Refills: 3 | Status: SHIPPED | OUTPATIENT
Start: 2022-04-25

## 2022-04-25 NOTE — PROGRESS NOTES
"Chief Complaint  Contraception    Subjective          Orly Hartmann presents to [facility]  History of Present Illness   26-year-old female presents today for an annual follow-up.  She is due for hepatitis C screening.  She states she has had the HPV vaccines.  She is due for Pap smear but she has never been sexually active.  She states that she feels like she would need to have something to sedate her before she could do a Pap smear.  She is on oral contraceptive pills due to heavy and irregular menstrual cycles.  She states that the oral contraceptive pills have helped with her menstrual cycles.    She has not had any lab work-up for cholesterol screening.  Her mother states that she does have a family history of thyroid disorder in her father and would like to have her thyroid levels checked.  She is not fasting today.    Current Outpatient Medications on File Prior to Visit   Medication Sig Dispense Refill   • cetirizine (zyrTEC) 10 MG tablet Take 1 tablet by mouth Every Night. 90 tablet 1   • fluticasone (Flonase) 50 MCG/ACT nasal spray 2 sprays into the nostril(s) as directed by provider Daily. 16 g 5   • [DISCONTINUED] Junel FE 1/20 1-20 MG-MCG per tablet TAKE 1 TABLET BY MOUTH EVERY DAY 28 tablet 5   • [DISCONTINUED] aspirin 81 MG chewable tablet Chew 81 mg Daily.       No current facility-administered medications on file prior to visit.       Objective   Vital Signs:   /58   Pulse 88   Temp 97.5 °F (36.4 °C)   Ht 177.8 cm (70\")   Wt 80 kg (176 lb 6.4 oz)   SpO2 99%   BMI 25.31 kg/m²     Physical Exam  Vitals reviewed.   Constitutional:       Appearance: Normal appearance. She is well-developed.   Neck:      Thyroid: No thyroid mass, thyromegaly or thyroid tenderness.   Cardiovascular:      Rate and Rhythm: Normal rate and regular rhythm.      Heart sounds: No murmur heard.    No friction rub. No gallop.   Pulmonary:      Effort: Pulmonary effort is normal.      " Breath sounds: Normal breath sounds. No wheezing or rhonchi.   Lymphadenopathy:      Cervical: No cervical adenopathy.   Skin:     General: Skin is warm and dry.   Neurological:      Mental Status: She is alert and oriented to person, place, and time.      Cranial Nerves: No cranial nerve deficit.   Psychiatric:         Mood and Affect: Mood and affect normal.         Behavior: Behavior normal.         Thought Content: Thought content normal. Thought content does not include homicidal or suicidal ideation.         Judgment: Judgment normal.        Result Review :                 Assessment and Plan    Diagnoses and all orders for this visit:    1. Annual physical exam (Primary)  Comments:  We discussed hep C screening, screening for lipid disorders and routine labs.  We discussed COVID booster.  She will return for fasting labs.  Orders:  -     CBC Auto Differential  -     Comprehensive Metabolic Panel  -     Lipid Panel  -     TSH+Free T4  -     Hepatitis C Antibody    2. Dysmenorrhea  Assessment & Plan:  She is doing well with oral contraceptive pills, will continue current OCPs.  Patient may follow-up in 1 year.  We discussed Pap smears but since she is not sexually active we will defer this until later.    Orders:  -     CBC Auto Differential  -     Comprehensive Metabolic Panel  -     TSH+Free T4    3. Need for hepatitis C screening test  -     Hepatitis C Antibody    4. Family history of thyroid disease in father  Assessment & Plan:  We will check TSH and free T4 with her labs.    Orders:  -     TSH+Free T4    Other orders  -     norethindrone-ethinyl estradiol FE (Junel FE 1/20) 1-20 MG-MCG per tablet; Take 1 tablet by mouth Daily.  Dispense: 84 tablet; Refill: 3      Follow Up   Return in about 1 year (around 4/25/2023) for Annual physical.  Patient was given instructions and counseling regarding her condition or for health maintenance advice. Please see specific information pulled into the AVS if  appropriate.

## 2022-04-29 ENCOUNTER — LAB (OUTPATIENT)
Dept: FAMILY MEDICINE CLINIC | Facility: CLINIC | Age: 27
End: 2022-04-29

## 2022-06-23 ENCOUNTER — OFFICE VISIT (OUTPATIENT)
Dept: FAMILY MEDICINE CLINIC | Facility: CLINIC | Age: 27
End: 2022-06-23

## 2022-06-23 VITALS
HEIGHT: 70 IN | DIASTOLIC BLOOD PRESSURE: 58 MMHG | BODY MASS INDEX: 25.03 KG/M2 | WEIGHT: 174.8 LBS | OXYGEN SATURATION: 98 % | TEMPERATURE: 98.2 F | HEART RATE: 75 BPM | SYSTOLIC BLOOD PRESSURE: 104 MMHG

## 2022-06-23 DIAGNOSIS — J02.9 ACUTE PHARYNGITIS, UNSPECIFIED ETIOLOGY: Primary | ICD-10-CM

## 2022-06-23 DIAGNOSIS — R05.9 COUGH: ICD-10-CM

## 2022-06-23 LAB
EXPIRATION DATE: NORMAL
INTERNAL CONTROL: NORMAL
Lab: NORMAL
S PYO AG THROAT QL: NEGATIVE

## 2022-06-23 PROCEDURE — 99213 OFFICE O/P EST LOW 20 MIN: CPT | Performed by: NURSE PRACTITIONER

## 2022-06-23 PROCEDURE — 87880 STREP A ASSAY W/OPTIC: CPT | Performed by: NURSE PRACTITIONER

## 2022-06-23 RX ORDER — BROMPHENIRAMINE MALEATE, PSEUDOEPHEDRINE HYDROCHLORIDE, AND DEXTROMETHORPHAN HYDROBROMIDE 2; 30; 10 MG/5ML; MG/5ML; MG/5ML
10 SYRUP ORAL 4 TIMES DAILY PRN
Qty: 400 ML | Refills: 0 | Status: SHIPPED | OUTPATIENT
Start: 2022-06-23 | End: 2022-07-03

## 2022-06-23 NOTE — PROGRESS NOTES
"Chief Complaint  Sore Throat and Cough    Subjective        Orly Hartmann presents to [facility]  Sore Throat   This is a new problem. Episode onset: 3 days ago. The problem has been waxing and waning. There has been no fever. The pain is moderate. Associated symptoms include congestion, coughing and a plugged ear sensation. Pertinent negatives include no headaches or shortness of breath. She has had no exposure to strep or mono.   Cough  This is a new problem. The current episode started yesterday. The problem has been waxing and waning. The cough is non-productive. Associated symptoms include ear congestion, nasal congestion and a sore throat. Pertinent negatives include no headaches or shortness of breath. Treatments tried: cough drops.       Objective   Vital Signs:  /58 (BP Location: Left arm, Patient Position: Sitting)   Pulse 75   Temp 98.2 °F (36.8 °C)   Ht 177.8 cm (70\")   Wt 79.3 kg (174 lb 12.8 oz)   SpO2 98%   BMI 25.08 kg/m²   Estimated body mass index is 25.08 kg/m² as calculated from the following:    Height as of this encounter: 177.8 cm (70\").    Weight as of this encounter: 79.3 kg (174 lb 12.8 oz).          Physical Exam  Vitals reviewed.   Constitutional:       Appearance: Normal appearance. She is well-developed.   HENT:      Head: Normocephalic and atraumatic.      Right Ear: Tympanic membrane and external ear normal.      Left Ear: Tympanic membrane and external ear normal.      Nose: Congestion present. No nasal tenderness.      Right Sinus: No maxillary sinus tenderness or frontal sinus tenderness.      Left Sinus: No maxillary sinus tenderness or frontal sinus tenderness.      Mouth/Throat:      Pharynx: No oropharyngeal exudate.   Eyes:      Conjunctiva/sclera: Conjunctivae normal.      Pupils: Pupils are equal, round, and reactive to light.   Cardiovascular:      Rate and Rhythm: Normal rate and regular rhythm.      Heart sounds: No murmur " heard.    No friction rub. No gallop.   Pulmonary:      Effort: Pulmonary effort is normal.      Breath sounds: Normal breath sounds. No wheezing or rhonchi.   Musculoskeletal:      Cervical back: Neck supple.   Lymphadenopathy:      Cervical: No cervical adenopathy.   Skin:     General: Skin is warm and dry.   Neurological:      Mental Status: She is alert and oriented to person, place, and time.        Result Review :                Assessment and Plan   Diagnoses and all orders for this visit:    1. Acute pharyngitis, unspecified etiology (Primary)  -     POCT rapid strep A  -     Cancel: COVID-19,APTIMA PANTHER(LIONEL),[facility], NP/OP SWAB IN UTM/VTM/SALINE TRANSPORT MEDIA,24 HR TAT - Swab, Nasopharynx    2. Cough  -     Cancel: COVID-19,APTIMA PANTHER(LIONEL),[facility], NP/OP SWAB IN UTM/VTM/SALINE TRANSPORT MEDIA,24 HR TAT - Swab, Nasopharynx    Other orders  -     brompheniramine-pseudoephedrine-DM 30-2-10 MG/5ML syrup; Take 10 mL by mouth 4 (Four) Times a Day As Needed for Cough or Allergies for up to 10 days.  Dispense: 400 mL; Refill: 0    Patient has acute pharyngitis.  Strep a is negative.  Family members have tested negative for COVID that are ill within her home.  Patient has not been taking her Zyrtec and Flonase.  Instructed her to resume Zyrtec and Flonase.  Also will prescribe Bromfed for cough.  Discussed symptomatic treatment including throat lozenges rest and fluids.       Follow Up   Return if symptoms worsen or fail to improve.  Patient was given instructions and counseling regarding her condition or for health maintenance advice. Please see specific information pulled into the AVS if appropriate.

## 2022-06-29 ENCOUNTER — OFFICE VISIT (OUTPATIENT)
Dept: FAMILY MEDICINE CLINIC | Facility: CLINIC | Age: 27
End: 2022-06-29

## 2022-06-29 VITALS
SYSTOLIC BLOOD PRESSURE: 110 MMHG | OXYGEN SATURATION: 98 % | DIASTOLIC BLOOD PRESSURE: 68 MMHG | WEIGHT: 172 LBS | TEMPERATURE: 97.5 F | BODY MASS INDEX: 24.62 KG/M2 | HEIGHT: 70 IN | HEART RATE: 110 BPM

## 2022-06-29 DIAGNOSIS — J06.9 UPPER RESPIRATORY TRACT INFECTION, UNSPECIFIED TYPE: Primary | ICD-10-CM

## 2022-06-29 PROCEDURE — 99213 OFFICE O/P EST LOW 20 MIN: CPT | Performed by: NURSE PRACTITIONER

## 2022-06-29 RX ORDER — METHYLPREDNISOLONE 4 MG/1
TABLET ORAL
Qty: 1 EACH | Refills: 0 | Status: SHIPPED | OUTPATIENT
Start: 2022-06-29 | End: 2022-08-18

## 2022-06-29 RX ORDER — AZITHROMYCIN 250 MG/1
TABLET, FILM COATED ORAL
Qty: 6 TABLET | Refills: 0 | Status: SHIPPED | OUTPATIENT
Start: 2022-06-29 | End: 2022-08-18

## 2022-06-29 NOTE — PROGRESS NOTES
"Chief Complaint  Sinus Problem and Cough    Subjective          Medical History: has no past medical history on file.     Surgical History: has a past surgical history that includes Cystectomy.     Family History: family history is not on file.     Social History: reports that she has never smoked. She has never used smokeless tobacco. She reports previous alcohol use. She reports that she does not use drugs.    Orly Hartmann presents to [facility]  Patient sees Jaquan Coughlin as her PCP.  She seen Jaquan on 6/23/2022 was diagnosed with a sinusitis given Bromfed and discharged back home.  Patient comes back in today stating that her mucus is now green, her cough has not improved and her symptoms feel like they are worsening.  She complains of postnasal drip, rhinorrhea and sore throat.  She states that there is other family members within the home with the same symptoms.  Denies any new COVID exposure    Cough  This is a new problem. The current episode started 1 to 4 weeks ago. The problem has been waxing and waning. The cough is productive of sputum (green in color). Associated symptoms include ear congestion, nasal congestion, postnasal drip, rhinorrhea and a sore throat. Pertinent negatives include no ear pain or fever. Nothing aggravates the symptoms.       Objective   Vital Signs:   /68   Pulse 110   Temp 97.5 °F (36.4 °C)   Ht 177.8 cm (70\")   Wt 78 kg (172 lb)   SpO2 98%   BMI 24.68 kg/m²     Physical Exam  Vitals reviewed.   Constitutional:       Appearance: Normal appearance. She is well-developed.   HENT:      Head: Normocephalic and atraumatic.      Right Ear: Tympanic membrane and external ear normal.      Left Ear: Tympanic membrane and external ear normal.      Mouth/Throat:      Pharynx: Posterior oropharyngeal erythema present. No oropharyngeal exudate.      Comments: Clear post nasal drip  Eyes:      Conjunctiva/sclera: Conjunctivae normal.      Pupils: " Pupils are equal, round, and reactive to light.   Cardiovascular:      Rate and Rhythm: Normal rate and regular rhythm.      Heart sounds: No murmur heard.    No friction rub.   Pulmonary:      Effort: Pulmonary effort is normal.      Breath sounds: Normal breath sounds. No wheezing or rhonchi.   Skin:     General: Skin is warm and dry.   Neurological:      Mental Status: She is alert and oriented to person, place, and time.   Psychiatric:         Mood and Affect: Mood and affect normal.         Behavior: Behavior normal.         Thought Content: Thought content normal.         Judgment: Judgment normal.        Result Review :   The following data was reviewed by: JUAN Santana on 06/29/2022:      Data reviewed: previous [facility] note            Current Outpatient Medications on File Prior to Visit   Medication Sig Dispense Refill   • brompheniramine-pseudoephedrine-DM 30-2-10 MG/5ML syrup Take 10 mL by mouth 4 (Four) Times a Day As Needed for Cough or Allergies for up to 10 days. 400 mL 0   • cetirizine (zyrTEC) 10 MG tablet Take 1 tablet by mouth Every Night. 90 tablet 1   • fluticasone (Flonase) 50 MCG/ACT nasal spray 2 sprays into the nostril(s) as directed by provider Daily. 16 g 5   • norethindrone-ethinyl estradiol FE (Junel FE 1/20) 1-20 MG-MCG per tablet Take 1 tablet by mouth Daily. 84 tablet 3     No current facility-administered medications on file prior to visit.        Assessment and Plan    Diagnoses and all orders for this visit:    1. Upper respiratory tract infection, unspecified type (Primary)  Comments:  Treat with azithromycin and Medrol Dosepak.  Discussed increase fluids water is best, continue on antihistamine.  Patient verbalized understanding.    Other orders  -     azithromycin (Zithromax Z-Mane) 250 MG tablet; Take 2 tablets by mouth on day 1, then 1 tablet daily on days 2-5  Dispense: 6 tablet; Refill: 0  -     methylPREDNISolone (MEDROL) 4 MG dose pack; Take as directed  on package instructions.  Dispense: 1 each; Refill: 0        Follow Up {Instructions Charge Capture  Follow-up Communications :23}  Return for If symptoms do not improve new concerning symptoms.  Patient was given instructions and counseling regarding her condition or for health maintenance advice. Please see specific information pulled into the AVS if appropriate.

## 2022-06-30 RX ORDER — ACETAMINOPHEN 500 MG
1 TABLET ORAL 2 TIMES DAILY
Qty: 28 CAPSULE | Refills: 0 | Status: SHIPPED | OUTPATIENT
Start: 2022-06-30 | End: 2022-07-01 | Stop reason: SDUPTHER

## 2022-06-30 RX ORDER — AZITHROMYCIN 250 MG/1
TABLET, FILM COATED ORAL
Qty: 6 TABLET | Refills: 0 | OUTPATIENT
Start: 2022-06-30

## 2022-06-30 NOTE — TELEPHONE ENCOUNTER
Attempted to call patient and got  Voicemail. Left message for patient to call  Back regarding azithromycin. Hub please warm transfer.

## 2022-06-30 NOTE — TELEPHONE ENCOUNTER
Caller: Orly Hartmann    Relationship: Self    Best call back number: [phone]    What medications are you currently taking:   Current Outpatient Medications on File Prior to Visit   Medication Sig Dispense Refill   • azithromycin (Zithromax Z-Mane) 250 MG tablet Take 2 tablets by mouth on day 1, then 1 tablet daily on days 2-5 6 tablet 0   • brompheniramine-pseudoephedrine-DM 30-2-10 MG/5ML syrup Take 10 mL by mouth 4 (Four) Times a Day As Needed for Cough or Allergies for up to 10 days. 400 mL 0   • cetirizine (zyrTEC) 10 MG tablet Take 1 tablet by mouth Every Night. 90 tablet 1   • fluticasone (Flonase) 50 MCG/ACT nasal spray 2 sprays into the nostril(s) as directed by provider Daily. 16 g 5   • methylPREDNISolone (MEDROL) 4 MG dose pack Take as directed on package instructions. 1 each 0   • norethindrone-ethinyl estradiol FE (Junel FE 1/20) 1-20 MG-MCG per tablet Take 1 tablet by mouth Daily. 84 tablet 3     No current facility-administered medications on file prior to visit.          When did you start taking these medications: 06/29/2022    Which medication are you concerned about:   azithromycin (Zithromax Z-Mane) 250 MG tablet    Who prescribed you this medication: FAHAD DUBOSE    What are your concerns: SINCE TAKING THE MEDICATION, PATIENT HAS HAD DIARRHEA LIKE BOWELS 3 TIMES. SHE WOULD TO KNOW IF THAT CAUSED FROM MEDICATION OR IF IT IS SOMETHING ELSE. SHE WOULD LIKE A CALL BACK FROM CLINICAL STAFF REGARDING THIS SO SHE IS AWARE.     How long have you had these concerns: 06/29/2022

## 2022-06-30 NOTE — TELEPHONE ENCOUNTER
Any antibiotic can cause loose stools or diarrhea.  I can send over a probiotic to take twice daily for the next 2 weeks.  If the diarrhea persist after stopping the probiotic please be seen back in the office.

## 2022-07-01 RX ORDER — ACETAMINOPHEN 500 MG
1 TABLET ORAL 2 TIMES DAILY
Qty: 28 CAPSULE | Refills: 0 | Status: SHIPPED | OUTPATIENT
Start: 2022-07-01 | End: 2022-07-15

## 2022-08-18 ENCOUNTER — OFFICE VISIT (OUTPATIENT)
Dept: FAMILY MEDICINE CLINIC | Facility: CLINIC | Age: 27
End: 2022-08-18

## 2022-08-18 VITALS
BODY MASS INDEX: 23.88 KG/M2 | DIASTOLIC BLOOD PRESSURE: 58 MMHG | TEMPERATURE: 97.6 F | WEIGHT: 166.8 LBS | OXYGEN SATURATION: 98 % | HEART RATE: 92 BPM | HEIGHT: 70 IN | SYSTOLIC BLOOD PRESSURE: 100 MMHG

## 2022-08-18 DIAGNOSIS — U07.1 COVID-19: Primary | ICD-10-CM

## 2022-08-18 DIAGNOSIS — M25.512 ACUTE PAIN OF LEFT SHOULDER: ICD-10-CM

## 2022-08-18 DIAGNOSIS — M54.50 ACUTE MIDLINE LOW BACK PAIN WITHOUT SCIATICA: ICD-10-CM

## 2022-08-18 LAB
EXPIRATION DATE: ABNORMAL
FLUAV AG UPPER RESP QL IA.RAPID: NOT DETECTED
FLUBV AG UPPER RESP QL IA.RAPID: NOT DETECTED
INTERNAL CONTROL: ABNORMAL
Lab: ABNORMAL
SARS-COV-2 AG UPPER RESP QL IA.RAPID: DETECTED

## 2022-08-18 PROCEDURE — 87428 SARSCOV & INF VIR A&B AG IA: CPT | Performed by: NURSE PRACTITIONER

## 2022-08-18 PROCEDURE — 99214 OFFICE O/P EST MOD 30 MIN: CPT | Performed by: NURSE PRACTITIONER

## 2022-08-18 RX ORDER — ETODOLAC 500 MG/1
500 TABLET, FILM COATED ORAL 2 TIMES DAILY
Qty: 60 TABLET | Refills: 2 | Status: SHIPPED | OUTPATIENT
Start: 2022-08-18 | End: 2022-11-09

## 2022-08-18 RX ORDER — DEXTROMETHORPHAN HYDROBROMIDE AND PROMETHAZINE HYDROCHLORIDE 15; 6.25 MG/5ML; MG/5ML
5 SOLUTION ORAL 4 TIMES DAILY PRN
Qty: 300 ML | Refills: 0 | Status: SHIPPED | OUTPATIENT
Start: 2022-08-18 | End: 2022-08-28

## 2022-08-18 RX ORDER — METHYLPREDNISOLONE 4 MG/1
TABLET ORAL
Qty: 1 EACH | Refills: 0 | Status: SHIPPED | OUTPATIENT
Start: 2022-08-18 | End: 2022-11-09

## 2022-08-18 RX ORDER — GUAIFENESIN AND DEXTROMETHORPHAN HYDROBROMIDE 600; 30 MG/1; MG/1
1 TABLET, EXTENDED RELEASE ORAL 2 TIMES DAILY PRN
Qty: 20 TABLET | Refills: 0 | Status: SHIPPED | OUTPATIENT
Start: 2022-08-18 | End: 2022-11-09

## 2022-08-18 NOTE — PROGRESS NOTES
"Chief Complaint  Sore Throat, Cough, and Back Pain    Subjective          Medical History: has no past medical history on file.     Surgical History: has a past surgical history that includes Cystectomy.     Family History: family history is not on file.     Social History: reports that she has never smoked. She has never used smokeless tobacco. She reports previous alcohol use. She reports that she does not use drugs.    Orly Hartmann presents to [facility]  Patient has close contact with COVID positive.  She states her dad and sister were tested positive for COVID today.    Cough  This is a new problem. The current episode started yesterday. The problem occurs constantly. The cough is non-productive. Associated symptoms include chills, myalgias, nasal congestion and rhinorrhea. Pertinent negatives include no shortness of breath. Nothing aggravates the symptoms. She has tried nothing for the symptoms. The treatment provided no relief.   Back Pain  This is a new problem. The current episode started 1 to 4 weeks ago. The problem occurs intermittently. The problem has been waxing and waning since onset. The pain is present in the lumbar spine. The quality of the pain is described as aching. The pain does not radiate. The pain is mild. Pertinent negatives include no bladder incontinence, bowel incontinence, numbness, paresthesias or tingling. She has tried nothing for the symptoms.   Shoulder Injury   The left shoulder is affected. There was no injury mechanism. The quality of the pain is described as aching. The pain does not radiate. Pertinent negatives include no numbness or tingling. The symptoms are aggravated by movement. She has tried nothing for the symptoms.       Objective   Vital Signs:   /58   Pulse 92   Temp 97.6 °F (36.4 °C)   Ht 177.8 cm (70\")   Wt 75.7 kg (166 lb 12.8 oz)   SpO2 98%   BMI 23.93 kg/m²     Physical Exam  Vitals reviewed.   Constitutional:      "  Appearance: Normal appearance. She is well-developed.   HENT:      Head: Normocephalic and atraumatic.      Right Ear: External ear normal.      Left Ear: External ear normal.   Eyes:      Conjunctiva/sclera: Conjunctivae normal.      Pupils: Pupils are equal, round, and reactive to light.   Cardiovascular:      Rate and Rhythm: Normal rate and regular rhythm.      Heart sounds: No murmur heard.    No friction rub. No gallop.   Pulmonary:      Effort: Pulmonary effort is normal.      Breath sounds: Normal breath sounds. No wheezing or rhonchi.   Musculoskeletal:      Left shoulder: No swelling, deformity, bony tenderness or crepitus. Normal range of motion.      Lumbar back: No swelling, deformity, tenderness or bony tenderness.   Skin:     General: Skin is warm and dry.   Neurological:      Mental Status: She is alert and oriented to person, place, and time.   Psychiatric:         Mood and Affect: Mood and affect normal.         Behavior: Behavior normal.         Thought Content: Thought content normal.         Judgment: Judgment normal.        Result Review :   The following data was reviewed by: JUAN Santana on 08/18/2022:                  Current Outpatient Medications on File Prior to Visit   Medication Sig Dispense Refill   • norethindrone-ethinyl estradiol FE (Junel FE 1/20) 1-20 MG-MCG per tablet Take 1 tablet by mouth Daily. 84 tablet 3   • cetirizine (zyrTEC) 10 MG tablet Take 1 tablet by mouth Every Night. 90 tablet 1   • fluticasone (Flonase) 50 MCG/ACT nasal spray 2 sprays into the nostril(s) as directed by provider Daily. 16 g 5     No current facility-administered medications on file prior to visit.        Assessment and Plan    Diagnoses and all orders for this visit:    1. COVID-19 (Primary)  Comments:  We will treat with Promethazine DM, Medrol steroid pack, Mucinex DM.  Discussed home, rest, return precautions.  Patient verbalized understanding  Orders:  -     POCT SARS-CoV-2  Antigen GEORGINA + Flu    2. Acute pain of left shoulder  Comments:  Will start on NSAIDs, patient given stretches and exercises to do at home if no improvement please get x-ray.  Patient is agreeable  Orders:  -     XR Shoulder 2+ View Left; Future    3. Acute midline low back pain without sciatica  Comments:  Patient given NSAIDs, stretches, and exercises to do at home to help with the back pain.  If no improvement get x-ray of back.  Orders:  -     XR Spine Lumbar Complete 4+VW; Future    Other orders  -     guaifenesin-dextromethorphan (MUCINEX DM)  MG tablet sustained-release 12 hour tablet; Take 1 tablet by mouth 2 (Two) Times a Day As Needed (CONGESTION).  Dispense: 20 tablet; Refill: 0  -     promethazine-dextromethorphan (PROMETHAZINE-DM) 6.25-15 MG/5ML solution; Take 5 mL by mouth 4 (Four) Times a Day As Needed for Cough for up to 10 days.  Dispense: 300 mL; Refill: 0  -     methylPREDNISolone (MEDROL) 4 MG dose pack; Take as directed on package instructions.  Dispense: 1 each; Refill: 0  -     etodolac (LODINE) 500 MG tablet; Take 1 tablet by mouth 2 (Two) Times a Day.  Dispense: 60 tablet; Refill: 2        Follow Up   Return for If symptoms do not improve new concerning symptoms.  Patient was given instructions and counseling regarding her condition or for health maintenance advice. Please see specific information pulled into the AVS if appropriate.

## 2022-11-07 ENCOUNTER — HOSPITAL ENCOUNTER (OUTPATIENT)
Dept: GENERAL RADIOLOGY | Facility: HOSPITAL | Age: 27
Discharge: HOME | End: 2022-11-07

## 2022-11-07 DIAGNOSIS — M54.50 ACUTE MIDLINE LOW BACK PAIN WITHOUT SCIATICA: ICD-10-CM

## 2022-11-07 DIAGNOSIS — M25.512 ACUTE PAIN OF LEFT SHOULDER: ICD-10-CM

## 2022-11-07 PROCEDURE — 73030 X-RAY EXAM OF SHOULDER: CPT

## 2022-11-07 PROCEDURE — 72110 X-RAY EXAM L-2 SPINE 4/>VWS: CPT

## 2022-11-09 ENCOUNTER — OFFICE VISIT (OUTPATIENT)
Dept: FAMILY MEDICINE CLINIC | Facility: CLINIC | Age: 27
End: 2022-11-09

## 2022-11-09 VITALS
OXYGEN SATURATION: 97 % | WEIGHT: 157.6 LBS | HEIGHT: 70 IN | SYSTOLIC BLOOD PRESSURE: 94 MMHG | TEMPERATURE: 98.5 F | HEART RATE: 100 BPM | DIASTOLIC BLOOD PRESSURE: 48 MMHG | BODY MASS INDEX: 22.56 KG/M2

## 2022-11-09 DIAGNOSIS — L65.9 HAIR LOSS: ICD-10-CM

## 2022-11-09 DIAGNOSIS — R53.83 FATIGUE, UNSPECIFIED TYPE: ICD-10-CM

## 2022-11-09 DIAGNOSIS — Z13.220 LIPID SCREENING: ICD-10-CM

## 2022-11-09 DIAGNOSIS — M54.50 ACUTE MIDLINE LOW BACK PAIN WITHOUT SCIATICA: Primary | ICD-10-CM

## 2022-11-09 DIAGNOSIS — Z11.59 ENCOUNTER FOR HEPATITIS C SCREENING TEST FOR LOW RISK PATIENT: ICD-10-CM

## 2022-11-09 DIAGNOSIS — M25.512 ACUTE PAIN OF LEFT SHOULDER: ICD-10-CM

## 2022-11-09 LAB
25(OH)D3 SERPL-MCNC: 35.3 NG/ML (ref 30–100)
ALBUMIN SERPL-MCNC: 4.8 G/DL (ref 3.5–5.2)
ALBUMIN/GLOB SERPL: 1.8 G/DL
ALP SERPL-CCNC: 47 U/L (ref 39–117)
ALT SERPL W P-5'-P-CCNC: 13 U/L (ref 1–33)
ANION GAP SERPL CALCULATED.3IONS-SCNC: 9 MMOL/L (ref 5–15)
AST SERPL-CCNC: 22 U/L (ref 1–32)
BILIRUB SERPL-MCNC: 0.4 MG/DL (ref 0–1.2)
BUN SERPL-MCNC: 10 MG/DL (ref 6–20)
BUN/CREAT SERPL: 13.2 (ref 7–25)
BURR CELLS BLD QL SMEAR: ABNORMAL
CALCIUM SPEC-SCNC: 9.7 MG/DL (ref 8.6–10.5)
CHLORIDE SERPL-SCNC: 101 MMOL/L (ref 98–107)
CHOLEST SERPL-MCNC: 222 MG/DL (ref 0–200)
CO2 SERPL-SCNC: 27 MMOL/L (ref 22–29)
CREAT SERPL-MCNC: 0.76 MG/DL (ref 0.57–1)
DEPRECATED RDW RBC AUTO: 39.1 FL (ref 37–54)
EGFRCR SERPLBLD CKD-EPI 2021: 112.4 ML/MIN/1.73
EOSINOPHIL # BLD MANUAL: 0.13 10*3/MM3 (ref 0–0.4)
EOSINOPHIL NFR BLD MANUAL: 2.4 % (ref 0.3–6.2)
ERYTHROCYTE [DISTWIDTH] IN BLOOD BY AUTOMATED COUNT: 12 % (ref 12.3–15.4)
FERRITIN SERPL-MCNC: 146 NG/ML (ref 13–150)
FOLATE SERPL-MCNC: 18.4 NG/ML (ref 4.78–24.2)
GLOBULIN UR ELPH-MCNC: 2.7 GM/DL
GLUCOSE SERPL-MCNC: 76 MG/DL (ref 65–99)
HCT VFR BLD AUTO: 42.9 % (ref 34–46.6)
HCV AB SER DONR QL: NORMAL
HDLC SERPL-MCNC: 41 MG/DL (ref 40–60)
HGB BLD-MCNC: 14.3 G/DL (ref 12–15.9)
IRON 24H UR-MRATE: 142 MCG/DL (ref 37–145)
IRON SATN MFR SERPL: 31 % (ref 20–50)
LDLC SERPL CALC-MCNC: 167 MG/DL (ref 0–100)
LDLC/HDLC SERPL: 4.02 {RATIO}
LYMPHOCYTES # BLD MANUAL: 2.27 10*3/MM3 (ref 0.7–3.1)
LYMPHOCYTES NFR BLD MANUAL: 2.4 % (ref 5–12)
MCH RBC QN AUTO: 29.7 PG (ref 26.6–33)
MCHC RBC AUTO-ENTMCNC: 33.3 G/DL (ref 31.5–35.7)
MCV RBC AUTO: 89 FL (ref 79–97)
MONOCYTES # BLD: 0.13 10*3/MM3 (ref 0.1–0.9)
NEUTROPHILS # BLD AUTO: 2.98 10*3/MM3 (ref 1.7–7)
NEUTROPHILS NFR BLD MANUAL: 54.1 % (ref 42.7–76)
PLAT MORPH BLD: NORMAL
PLATELET # BLD AUTO: 334 10*3/MM3 (ref 140–450)
PMV BLD AUTO: 9.8 FL (ref 6–12)
POIKILOCYTOSIS BLD QL SMEAR: ABNORMAL
POTASSIUM SERPL-SCNC: 4.1 MMOL/L (ref 3.5–5.2)
PROT SERPL-MCNC: 7.5 G/DL (ref 6–8.5)
RBC # BLD AUTO: 4.82 10*6/MM3 (ref 3.77–5.28)
SODIUM SERPL-SCNC: 137 MMOL/L (ref 136–145)
T-UPTAKE NFR SERPL: 1.06 TBI (ref 0.8–1.3)
T4 SERPL-MCNC: 10.5 MCG/DL (ref 4.5–11.7)
TIBC SERPL-MCNC: 459 MCG/DL (ref 298–536)
TRANSFERRIN SERPL-MCNC: 308 MG/DL (ref 200–360)
TRIGL SERPL-MCNC: 81 MG/DL (ref 0–150)
TSH SERPL DL<=0.05 MIU/L-ACNC: 1.52 UIU/ML (ref 0.27–4.2)
VARIANT LYMPHS NFR BLD MANUAL: 41.2 % (ref 19.6–45.3)
VIT B12 BLD-MCNC: 484 PG/ML (ref 211–946)
VLDLC SERPL-MCNC: 14 MG/DL (ref 5–40)
WBC MORPH BLD: NORMAL
WBC NRBC COR # BLD: 5.51 10*3/MM3 (ref 3.4–10.8)

## 2022-11-09 PROCEDURE — 85027 COMPLETE CBC AUTOMATED: CPT | Performed by: NURSE PRACTITIONER

## 2022-11-09 PROCEDURE — 86803 HEPATITIS C AB TEST: CPT | Performed by: NURSE PRACTITIONER

## 2022-11-09 PROCEDURE — 84479 ASSAY OF THYROID (T3 OR T4): CPT | Performed by: NURSE PRACTITIONER

## 2022-11-09 PROCEDURE — 84466 ASSAY OF TRANSFERRIN: CPT | Performed by: NURSE PRACTITIONER

## 2022-11-09 PROCEDURE — 84443 ASSAY THYROID STIM HORMONE: CPT | Performed by: NURSE PRACTITIONER

## 2022-11-09 PROCEDURE — 85007 BL SMEAR W/DIFF WBC COUNT: CPT | Performed by: NURSE PRACTITIONER

## 2022-11-09 PROCEDURE — 82728 ASSAY OF FERRITIN: CPT | Performed by: NURSE PRACTITIONER

## 2022-11-09 PROCEDURE — 80061 LIPID PANEL: CPT | Performed by: NURSE PRACTITIONER

## 2022-11-09 PROCEDURE — 82607 VITAMIN B-12: CPT | Performed by: NURSE PRACTITIONER

## 2022-11-09 PROCEDURE — 83540 ASSAY OF IRON: CPT | Performed by: NURSE PRACTITIONER

## 2022-11-09 PROCEDURE — 82746 ASSAY OF FOLIC ACID SERUM: CPT | Performed by: NURSE PRACTITIONER

## 2022-11-09 PROCEDURE — 82306 VITAMIN D 25 HYDROXY: CPT | Performed by: NURSE PRACTITIONER

## 2022-11-09 PROCEDURE — 99214 OFFICE O/P EST MOD 30 MIN: CPT | Performed by: NURSE PRACTITIONER

## 2022-11-09 PROCEDURE — 80053 COMPREHEN METABOLIC PANEL: CPT | Performed by: NURSE PRACTITIONER

## 2022-11-09 PROCEDURE — 84436 ASSAY OF TOTAL THYROXINE: CPT | Performed by: NURSE PRACTITIONER

## 2022-11-09 PROCEDURE — 36415 COLL VENOUS BLD VENIPUNCTURE: CPT | Performed by: NURSE PRACTITIONER

## 2022-11-09 NOTE — PROGRESS NOTES
"Chief Complaint  Shoulder Pain, Neck Pain, Back Pain, and Alopecia    Subjective         Orly Hartmann presents to [facility]  HPI   Presents today for an acute visit for shoulder neck and back pain.  She has been doing more stretching and exercising.  Pain has slightly improved.  She has had previous x-rays have been within normal limits.  She did not take the NSAID due to thought the medication was causing her hair loss.  She has been noticing more hair on her clothing.  No bald spots on the scalp.  She has been feeling a little bit more fatigued lately.  She is sleeping 7 to 8 hours a day.    Social History     Socioeconomic History   • Marital status: Single   Tobacco Use   • Smoking status: Never   • Smokeless tobacco: Never   Vaping Use   • Vaping Use: Never used   Substance and Sexual Activity   • Alcohol use: Not Currently   • Drug use: Never   • Sexual activity: Defer        Objective     Vitals:    11/09/22 1025   BP: 94/48   BP Location: Left arm   Patient Position: Sitting   Pulse: 100   Temp: 98.5 °F (36.9 °C)   TempSrc: Oral   SpO2: 97%   Weight: 71.5 kg (157 lb 9.6 oz)   Height: 177.8 cm (70\")        Body mass index is 22.61 kg/m².    Wt Readings from Last 3 Encounters:   11/09/22 71.5 kg (157 lb 9.6 oz)   08/18/22 75.7 kg (166 lb 12.8 oz)   06/29/22 78 kg (172 lb)       BP Readings from Last 3 Encounters:   11/09/22 94/48   08/18/22 100/58   06/29/22 110/68         BMI is within normal parameters. No other follow-up for BMI required.        Physical Exam  Vitals reviewed.   Constitutional:       Appearance: Normal appearance. She is well-developed.   HENT:      Head: Normocephalic and atraumatic.      Right Ear: External ear normal.      Left Ear: External ear normal.      Mouth/Throat:      Pharynx: No oropharyngeal exudate.   Eyes:      Conjunctiva/sclera: Conjunctivae normal.      Pupils: Pupils are equal, round, and reactive to light.   Cardiovascular:      Rate " and Rhythm: Normal rate and regular rhythm.      Heart sounds: No murmur heard.    No friction rub. No gallop.   Pulmonary:      Effort: Pulmonary effort is normal.      Breath sounds: Normal breath sounds. No wheezing or rhonchi.   Abdominal:      General: Bowel sounds are normal. There is no distension.      Palpations: Abdomen is soft.      Tenderness: There is no abdominal tenderness.   Skin:     General: Skin is warm and dry.   Neurological:      Mental Status: She is alert and oriented to person, place, and time.   Psychiatric:         Mood and Affect: Mood normal. Affect is flat.         Behavior: Behavior is withdrawn.         Thought Content: Thought content normal.         Judgment: Judgment normal.          Result Review :   The following data was reviewed by: JUAN Palma on 11/09/2022:      Procedures    Assessment and Plan   Diagnoses and all orders for this visit:    1. Acute midline low back pain without sciatica (Primary)    2. Lipid screening  -     Lipid panel    3. Acute pain of left shoulder    4. Fatigue, unspecified type  -     CBC With Manual Differential  -     Comprehensive Metabolic Panel  -     Ferritin  -     Iron Profile  -     Thyroid Panel With TSH  -     Vitamin D,25-Hydroxy  -     Vitamin B12 & Folate    5. Hair loss  -     Thyroid Panel With TSH  -     Vitamin D,25-Hydroxy  -     Vitamin B12 & Folate    6. Encounter for hepatitis C screening test for low risk patient  -     Hepatitis C Antibody    Will order the following labs for fatigue and hair loss.  Discussed continue stretching exercising.  Discussed taking NSAIDs with food.  Discussed chiropractic care and physical therapy.        Follow Up   Return in about 3 months (around 2/9/2023), or if symptoms worsen or fail to improve, for Next scheduled follow up.  Patient was given instructions and counseling regarding her condition or for health maintenance advice. Please see specific information pulled into the AVS if  appropriate.     Please note that portions of this note were completed with a voice recognition program.

## 2022-11-10 ENCOUNTER — TELEPHONE (OUTPATIENT)
Dept: FAMILY MEDICINE CLINIC | Facility: CLINIC | Age: 27
End: 2022-11-10

## 2022-11-10 NOTE — TELEPHONE ENCOUNTER
Patient is returning call to office. Hoping it was for lab results. Please call and advise. [phone]

## 2022-11-18 ENCOUNTER — HOSPITAL ENCOUNTER (EMERGENCY)
Facility: HOSPITAL | Age: 27
Discharge: HOME | End: 2022-11-18
Attending: EMERGENCY MEDICINE | Admitting: EMERGENCY MEDICINE

## 2022-11-18 VITALS
OXYGEN SATURATION: 100 % | WEIGHT: 160.05 LBS | HEIGHT: 70 IN | DIASTOLIC BLOOD PRESSURE: 60 MMHG | BODY MASS INDEX: 22.91 KG/M2 | SYSTOLIC BLOOD PRESSURE: 109 MMHG | TEMPERATURE: 98.7 F | HEART RATE: 68 BPM | RESPIRATION RATE: 18 BRPM

## 2022-11-18 DIAGNOSIS — Z20.822 EXPOSURE TO COVID-19 VIRUS: Primary | ICD-10-CM

## 2022-11-18 DIAGNOSIS — R05.1 ACUTE COUGH: ICD-10-CM

## 2022-11-18 DIAGNOSIS — R07.89 CHEST TIGHTNESS: ICD-10-CM

## 2022-11-18 LAB
FLUAV AG NPH QL: NEGATIVE
FLUBV AG NPH QL IA: NEGATIVE
S PYO AG THROAT QL: NEGATIVE
SARS-COV-2 RNA PNL SPEC NAA+PROBE: NOT DETECTED

## 2022-11-18 PROCEDURE — 93005 ELECTROCARDIOGRAM TRACING: CPT | Performed by: EMERGENCY MEDICINE

## 2022-11-18 PROCEDURE — 87081 CULTURE SCREEN ONLY: CPT | Performed by: EMERGENCY MEDICINE

## 2022-11-18 PROCEDURE — 87804 INFLUENZA ASSAY W/OPTIC: CPT | Performed by: EMERGENCY MEDICINE

## 2022-11-18 PROCEDURE — 87880 STREP A ASSAY W/OPTIC: CPT | Performed by: EMERGENCY MEDICINE

## 2022-11-18 PROCEDURE — 99283 EMERGENCY DEPT VISIT LOW MDM: CPT

## 2022-11-18 PROCEDURE — U0004 COV-19 TEST NON-CDC HGH THRU: HCPCS | Performed by: EMERGENCY MEDICINE

## 2022-11-18 PROCEDURE — C9803 HOPD COVID-19 SPEC COLLECT: HCPCS | Performed by: EMERGENCY MEDICINE

## 2022-11-18 RX ADMIN — IBUPROFEN 600 MG: 200 TABLET, FILM COATED ORAL at 20:31

## 2022-11-19 LAB — QT INTERVAL: 412 MS

## 2022-11-20 LAB — BACTERIA SPEC AEROBE CULT: NORMAL

## 2022-12-21 ENCOUNTER — OFFICE VISIT (OUTPATIENT)
Dept: FAMILY MEDICINE CLINIC | Facility: CLINIC | Age: 27
End: 2022-12-21

## 2022-12-21 VITALS
HEIGHT: 70 IN | WEIGHT: 158 LBS | BODY MASS INDEX: 22.62 KG/M2 | HEART RATE: 92 BPM | TEMPERATURE: 98.1 F | OXYGEN SATURATION: 98 % | SYSTOLIC BLOOD PRESSURE: 106 MMHG | DIASTOLIC BLOOD PRESSURE: 60 MMHG

## 2022-12-21 DIAGNOSIS — J02.9 SORE THROAT: ICD-10-CM

## 2022-12-21 DIAGNOSIS — R05.1 ACUTE COUGH: Primary | ICD-10-CM

## 2022-12-21 LAB
EXPIRATION DATE: NORMAL
EXPIRATION DATE: NORMAL
FLUAV AG UPPER RESP QL IA.RAPID: NOT DETECTED
FLUBV AG UPPER RESP QL IA.RAPID: NOT DETECTED
INTERNAL CONTROL: NORMAL
INTERNAL CONTROL: NORMAL
Lab: NORMAL
Lab: NORMAL
S PYO AG THROAT QL: NEGATIVE
SARS-COV-2 AG UPPER RESP QL IA.RAPID: NOT DETECTED

## 2022-12-21 PROCEDURE — 87880 STREP A ASSAY W/OPTIC: CPT | Performed by: NURSE PRACTITIONER

## 2022-12-21 PROCEDURE — 87428 SARSCOV & INF VIR A&B AG IA: CPT | Performed by: NURSE PRACTITIONER

## 2022-12-21 PROCEDURE — 99214 OFFICE O/P EST MOD 30 MIN: CPT | Performed by: NURSE PRACTITIONER

## 2022-12-21 RX ORDER — BROMPHENIRAMINE MALEATE, PSEUDOEPHEDRINE HYDROCHLORIDE, AND DEXTROMETHORPHAN HYDROBROMIDE 2; 30; 10 MG/5ML; MG/5ML; MG/5ML
5 SYRUP ORAL 4 TIMES DAILY PRN
Qty: 250 ML | Refills: 0 | Status: SHIPPED | OUTPATIENT
Start: 2022-12-21 | End: 2023-03-23

## 2022-12-21 NOTE — PROGRESS NOTES
"Chief Complaint  Sore Throat and Nasal Congestion    Subjective        Medical History: has no past medical history on file.     Surgical History: has a past surgical history that includes Cystectomy.     Family History: family history is not on file.     Social History: reports that she has never smoked. She has never used smokeless tobacco. She reports that she does not currently use alcohol. She reports that she does not use drugs.    Orly Hartmann presents to [facility]  URI   This is a new problem. Episode onset: 2 days ago. There has been no fever. Associated symptoms include congestion, coughing, rhinorrhea, sinus pain and a sore throat. Pertinent negatives include no headaches, nausea, neck pain or vomiting. Associated symptoms comments: Symptoms are improvimg. Treatments tried: mucinex.   Sore Throat   This is a new problem. The current episode started in the past 7 days. The problem has been gradually improving. Neither side of throat is experiencing more pain than the other. The pain is moderate. Associated symptoms include congestion and coughing. Pertinent negatives include no headaches, neck pain, shortness of breath or vomiting. Treatments tried: mucinex.       Objective   Vital Signs:   /60 (BP Location: Right arm, Patient Position: Sitting, Cuff Size: Adult)   Pulse 92   Temp 98.1 °F (36.7 °C) (Temporal)   Ht 177.8 cm (70\")   Wt 71.7 kg (158 lb)   SpO2 98%   BMI 22.67 kg/m²       Wt Readings from Last 3 Encounters:   12/21/22 71.7 kg (158 lb)   11/18/22 72.6 kg (160 lb 0.9 oz)   11/09/22 71.5 kg (157 lb 9.6 oz)        BP Readings from Last 3 Encounters:   12/21/22 106/60   11/18/22 109/60   11/09/22 94/48        BMI is within normal parameters. No other follow-up for BMI required.       Physical Exam  Vitals reviewed.   Constitutional:       Appearance: Normal appearance. She is well-developed.   HENT:      Head: Normocephalic and atraumatic.      Right " Ear: Tympanic membrane normal.      Left Ear: Tympanic membrane normal.      Nose: Congestion and rhinorrhea present.      Mouth/Throat:      Mouth: Mucous membranes are moist.   Eyes:      Conjunctiva/sclera: Conjunctivae normal.      Pupils: Pupils are equal, round, and reactive to light.   Cardiovascular:      Rate and Rhythm: Normal rate and regular rhythm.      Heart sounds: No murmur heard.  Pulmonary:      Effort: Pulmonary effort is normal.      Breath sounds: Normal breath sounds. No wheezing or rhonchi.   Skin:     General: Skin is warm and dry.   Neurological:      Mental Status: She is alert and oriented to person, place, and time.   Psychiatric:         Mood and Affect: Mood and affect normal.         Behavior: Behavior normal.         Thought Content: Thought content normal.         Judgment: Judgment normal.        Result Review :  {The following data was reviewed by JUAN Santana on 12/21/2022.  Common labs    Common Labs 11/9/22 11/9/22 11/9/22    1127 1127 1127   Glucose   76   BUN   10   Creatinine   0.76   Sodium   137   Potassium   4.1   Chloride   101   Calcium   9.7   Albumin   4.80   Total Bilirubin   0.4   Alkaline Phosphatase   47   AST (SGOT)   22   ALT (SGPT)   13   WBC 5.51     Hemoglobin 14.3     Hematocrit 42.9     Platelets 334     Total Cholesterol  222 (A)    Triglycerides  81    HDL Cholesterol  41    LDL Cholesterol   167 (A)    (A) Abnormal value                        Current Outpatient Medications on File Prior to Visit   Medication Sig Dispense Refill   • norethindrone-ethinyl estradiol FE (Junel FE 1/20) 1-20 MG-MCG per tablet Take 1 tablet by mouth Daily. 84 tablet 3   • cetirizine (zyrTEC) 10 MG tablet Take 1 tablet by mouth Every Night. 90 tablet 1   • fluticasone (Flonase) 50 MCG/ACT nasal spray 2 sprays into the nostril(s) as directed by provider Daily. 16 g 5     No current facility-administered medications on file prior to visit.      Negative flu,  negative COVID, negative strep  Assessment and Plan  Diagnoses and all orders for this visit:    1. Acute cough (Primary)  Comments:  We will treat with Bromfed, discussed return precautions, patient verbalized understanding.  Orders:  -     POCT SARS-CoV-2 Antigen GEORGINA + Flu    2. Sore throat  -     POCT rapid strep A    Other orders  -     brompheniramine-pseudoephedrine-DM 30-2-10 MG/5ML syrup; Take 5 mL by mouth 4 (Four) Times a Day As Needed for Congestion, Cough or Allergies.  Dispense: 250 mL; Refill: 0    Will treat symptomatically, warm salt water gargles, Chloraseptic cough drops, ibuprofen, discussed return precautions, patient verbalized understanding.        Follow Up   Return for If symptoms do not improve new concerning symptoms.  Patient was given instructions and counseling regarding her condition or for health maintenance advice. Please see specific information pulled into the AVS if appropriate.       Part of this note may be electronic transcription/translation of spoken language to printed text using the Dragon dictation system

## 2023-02-27 ENCOUNTER — TELEPHONE (OUTPATIENT)
Dept: FAMILY MEDICINE CLINIC | Facility: CLINIC | Age: 28
End: 2023-02-27
Payer: COMMERCIAL

## 2023-02-27 NOTE — TELEPHONE ENCOUNTER
Caller: Orly Hartmann    Relationship: Self    Best call back number: [phone]    What is the best time to reach you: ANY    Who are you requesting to speak with (clinical staff, provider,  specific staff member): CLINICAL     What was the call regarding: PATIENT STATED SHE IS HAVING STOMACH PAIN AND TENDERNESS WHEN TOUCHING HER UPPER ABDOMEN PATIENT WOULD LIKE TO SPEAK WITH PCP ABOUT THIS AND RECEIVE CLINICAL ADVISE PATIENT STATED ITS BEEN GOING ON FOR TWO DAYS PLEASE ADVISE     Do you require a callback: YES

## 2023-02-28 ENCOUNTER — OFFICE VISIT (OUTPATIENT)
Dept: FAMILY MEDICINE CLINIC | Facility: CLINIC | Age: 28
End: 2023-02-28
Payer: COMMERCIAL

## 2023-02-28 VITALS
OXYGEN SATURATION: 99 % | SYSTOLIC BLOOD PRESSURE: 110 MMHG | TEMPERATURE: 98.4 F | BODY MASS INDEX: 21.63 KG/M2 | HEART RATE: 83 BPM | HEIGHT: 70 IN | DIASTOLIC BLOOD PRESSURE: 68 MMHG | WEIGHT: 151.1 LBS

## 2023-02-28 DIAGNOSIS — R10.9 STOMACH PAIN: ICD-10-CM

## 2023-02-28 DIAGNOSIS — T14.8XXA MUSCLE STRAIN: ICD-10-CM

## 2023-02-28 DIAGNOSIS — R10.84 GENERALIZED ABDOMINAL PAIN: Primary | ICD-10-CM

## 2023-02-28 LAB
B-HCG UR QL: NEGATIVE
BILIRUB BLD-MCNC: NEGATIVE MG/DL
CLARITY, POC: CLEAR
COLOR UR: YELLOW
EXPIRATION DATE: NORMAL
GLUCOSE UR STRIP-MCNC: NEGATIVE MG/DL
INTERNAL NEGATIVE CONTROL: NORMAL
INTERNAL POSITIVE CONTROL: NORMAL
KETONES UR QL: NEGATIVE
LEUKOCYTE EST, POC: NEGATIVE
Lab: NORMAL
NITRITE UR-MCNC: NEGATIVE MG/ML
PH UR: 6.5 [PH] (ref 5–8)
PROT UR STRIP-MCNC: NEGATIVE MG/DL
RBC # UR STRIP: NEGATIVE /UL
SP GR UR: 1.01 (ref 1–1.03)
UROBILINOGEN UR QL: NORMAL

## 2023-02-28 PROCEDURE — 96372 THER/PROPH/DIAG INJ SC/IM: CPT | Performed by: FAMILY MEDICINE

## 2023-02-28 PROCEDURE — 81002 URINALYSIS NONAUTO W/O SCOPE: CPT | Performed by: FAMILY MEDICINE

## 2023-02-28 PROCEDURE — 99213 OFFICE O/P EST LOW 20 MIN: CPT | Performed by: FAMILY MEDICINE

## 2023-02-28 PROCEDURE — 81025 URINE PREGNANCY TEST: CPT | Performed by: FAMILY MEDICINE

## 2023-02-28 RX ORDER — KETOROLAC TROMETHAMINE 30 MG/ML
60 INJECTION, SOLUTION INTRAMUSCULAR; INTRAVENOUS ONCE
Status: COMPLETED | OUTPATIENT
Start: 2023-02-28 | End: 2023-02-28

## 2023-02-28 RX ORDER — IBUPROFEN 800 MG/1
800 TABLET ORAL 2 TIMES DAILY PRN
Qty: 40 TABLET | Refills: 0 | Status: SHIPPED | OUTPATIENT
Start: 2023-02-28

## 2023-02-28 RX ORDER — TIZANIDINE 4 MG/1
4 TABLET ORAL 2 TIMES DAILY PRN
Qty: 20 TABLET | Refills: 0 | Status: SHIPPED | OUTPATIENT
Start: 2023-02-28

## 2023-02-28 RX ADMIN — KETOROLAC TROMETHAMINE 60 MG: 30 INJECTION, SOLUTION INTRAMUSCULAR; INTRAVENOUS at 14:23

## 2023-02-28 NOTE — PROGRESS NOTES
"Chief Complaint  Abdominal Pain    Subjective        Orly Hartmann presents to [facility]  History of Present Illness  Patient presents today accompanied by her mother for an acute visit describing abdominal pain.  She points to the midline of her abdomen and in the epigastric region but also inferior to this.  She denies any issues with stooling.  She does stool regularly although she did not yesterday.  She does describe her stools are [place] type IV.  Denies any straining when having a stool.  Denies any difficulty with voiding.  Urine dip today and pregnancy test are negative.  She reports her last period began on 2/21/2023.  She is not sexually active per history.  Her symptoms were the worst yesterday and have improved some today.  Hurts to bend over, coughs or laughs.  She does lift at work and works in [employer] (f. [employer]).  Objective   Vital Signs:  /68   Pulse 83   Temp 98.4 °F (36.9 °C)   Ht 177.8 cm (70\")   Wt 68.5 kg (151 lb 1.6 oz)   SpO2 99%   BMI 21.68 kg/m²   Estimated body mass index is 21.68 kg/m² as calculated from the following:    Height as of this encounter: 177.8 cm (70\").    Weight as of this encounter: 68.5 kg (151 lb 1.6 oz).       BMI is within normal parameters. No other follow-up for BMI required.      Physical Exam    General: AAO ×3, no acute distress, pleasant  HEENT: Normocephalic, atraumatic  Cardiovascular: Regular rate and rhythm without appreciable murmur  Respiratory: Clear to auscultation bilaterally no RRW  Gastrointestinal: Bowel sounds are present.  No guarding or rigidity.  She does have tenderness palpation in the epigastric region which is worsened with abdominal flexion.  No right upper quadrant and left upper quadrant pain.  No right lower quadrant or left lower quadrant pain.  Extremities: No edema  Neurologic: CN II through XII grossly intact   Psychiatric: Normal mood and affect  Result Review :                 "   Assessment and Plan   Diagnoses and all orders for this visit:    1. Generalized abdominal pain (Primary)    2. Stomach pain  -     POCT pregnancy, urine  -     POCT urinalysis dipstick, manual    3. Muscle strain    Other orders  -     ibuprofen (ADVIL,MOTRIN) 800 MG tablet; Take 1 tablet by mouth 2 (Two) Times a Day As Needed for Moderate Pain.  Dispense: 40 tablet; Refill: 0  -     tiZANidine (ZANAFLEX) 4 MG tablet; Take 1 tablet by mouth 2 (Two) Times a Day As Needed for Muscle Spasms.  Dispense: 20 tablet; Refill: 0    I discussed with patient that I suspect this is likely muscle strain causing her symptoms.  No hernia appreciated on exam today.  Symptoms are clearly reproducible with abdominal flexion.  I discussed with patient taking ibuprofen twice daily as needed.  I will also give her prescription for tizanidine.  I discussed with patient avoiding activities that exacerbate her pain.  I will keep her off of work today with the goal to return back to work tomorrow.  We discussed a Toradol 60 mg injection today.         Follow Up   Return if symptoms worsen or fail to improve.  Patient was given instructions and counseling regarding her condition or for health maintenance advice. Please see specific information pulled into the AVS if appropriate.

## 2023-02-28 NOTE — TELEPHONE ENCOUNTER
Patient stated that she thinks maybe she could of pulled a muscle. Told patient recommend going to urgent care if pain is tender to the touch. Patient stated that she would think about it . Told patient we could get her in to see Jaquan but it couldn't be today due to schedule being full. She stated that she thinks she would just monitor it for now and give us a call back if it worsened. Told patient to put some heat on the stomach and take tylenol or motrin for pain.

## 2023-03-23 PROCEDURE — 87081 CULTURE SCREEN ONLY: CPT | Performed by: PHYSICIAN ASSISTANT

## 2023-03-23 PROCEDURE — U0004 COV-19 TEST NON-CDC HGH THRU: HCPCS | Performed by: PHYSICIAN ASSISTANT

## 2023-05-18 NOTE — TELEPHONE ENCOUNTER
Caller: Orly Hartmann    Relationship: Self    Best call back number: [phone]    Requested Prescriptions:   Requested Prescriptions     Pending Prescriptions Disp Refills   • norethindrone-ethinyl estradiol FE (Junel FE 1/20) 1-20 MG-MCG per tablet 84 tablet 3     Sig: Take 1 tablet by mouth Daily.      Pharmacy where request should be sent: [id number] - [place], [place] - [address] [facility] - [phone]  - [phone] FX     Last office visit with prescribing clinician: 11/9/2022   Last telemedicine visit with prescribing clinician: 2/28/2023   Next office visit with prescribing clinician: Visit date not found     Does the patient have less than a 3 day supply:  [] Yes  [x] No    Would you like a call back once the refill request has been completed: [x] Yes [] No    If the office needs to give you a call back, can they leave a voicemail: [x] Yes [] No    Tab Bonilla Rep   05/18/23 10:42 EDT

## 2023-05-22 RX ORDER — NORETHINDRONE ACETATE AND ETHINYL ESTRADIOL 1MG-20(21)
1 KIT ORAL DAILY
Qty: 84 TABLET | Refills: 3 | Status: SHIPPED | OUTPATIENT
Start: 2023-05-22

## 2023-11-10 ENCOUNTER — CLINICAL SUPPORT (OUTPATIENT)
Dept: FAMILY MEDICINE CLINIC | Facility: CLINIC | Age: 28
End: 2023-11-10
Payer: COMMERCIAL

## 2023-11-10 DIAGNOSIS — Z23 NEED FOR INFLUENZA VACCINATION: Primary | ICD-10-CM

## 2023-12-06 ENCOUNTER — OFFICE VISIT (OUTPATIENT)
Dept: FAMILY MEDICINE CLINIC | Facility: CLINIC | Age: 28
End: 2023-12-06
Payer: COMMERCIAL

## 2023-12-06 VITALS
WEIGHT: 151 LBS | OXYGEN SATURATION: 91 % | BODY MASS INDEX: 21.62 KG/M2 | DIASTOLIC BLOOD PRESSURE: 60 MMHG | TEMPERATURE: 97.3 F | HEIGHT: 70 IN | SYSTOLIC BLOOD PRESSURE: 98 MMHG | HEART RATE: 83 BPM

## 2023-12-06 DIAGNOSIS — H92.01 RIGHT EAR PAIN: ICD-10-CM

## 2023-12-06 DIAGNOSIS — J30.1 ALLERGIC RHINITIS DUE TO POLLEN, UNSPECIFIED SEASONALITY: Primary | ICD-10-CM

## 2023-12-06 RX ORDER — FLUTICASONE PROPIONATE 50 MCG
2 SPRAY, SUSPENSION (ML) NASAL DAILY
Qty: 11.1 ML | Refills: 12 | Status: SHIPPED | OUTPATIENT
Start: 2023-12-06

## 2023-12-06 NOTE — PROGRESS NOTES
"Chief Complaint  Establish Care and Ear Drainage    Subjective      History of Present Illness  Orly Hartmann is a 27 y.o. female who presents to [facility] with a past medical history of dymenorrhea on OCP for this presents to [facility].  Pt reports having pressure in both ears for the past 3-4 days. Denies fever, runny nose or cough.    History reviewed. No pertinent past medical history.  Pt has never had sexual intercourse so has never had a pap smear.     Objective   Vital Signs:   Vitals:    12/06/23 1459   BP: 98/60   Pulse: 83   Temp: 97.3 °F (36.3 °C)   SpO2: 91%   Weight: 68.5 kg (151 lb)   Height: 177.8 cm (70\")     Body mass index is 21.67 kg/m².    Wt Readings from Last 3 Encounters:   12/06/23 68.5 kg (151 lb)   07/05/23 67.6 kg (149 lb)   03/23/23 70.3 kg (155 lb)     BP Readings from Last 3 Encounters:   12/06/23 98/60   07/05/23 101/65   03/23/23 94/58       Health Maintenance   Topic Date Due    PAP SMEAR  Never done    ANNUAL PHYSICAL  04/25/2023    COVID-19 Vaccine (2 - 2023-24 season) 12/08/2023 (Originally 9/1/2023)    TDAP/TD VACCINES (2 - Td or Tdap) 02/08/2032    HEPATITIS C SCREENING  Completed    INFLUENZA VACCINE  Completed    Pneumococcal Vaccine 0-64  Aged Out    HPV VACCINES  Discontinued       Physical Exam     General:  AAO x3, no acute distress.  Eyes:  EOMI PERRLA  Ears/Nose/Mouth/Throat:  Moist mucous membranes  Respiratory:  CTA bilaterally, no wheezes rales or rhonchi  Cardiovascular:  RRR no murmur rubs or gallops  Gastrointestinal:  Abdomen soft nondistended nontender bowel sounds present x4 quadrants  Musculoskeletal:  Moves all 4 extremities spontaneously, no apparent weakness  Skin:  Warm, dry, no skin rashes or lesions  Neurologic:  AAO x3, cranial nerves 2-12 grossly intact, no focal neuro deficits  Psychiatric:  Normal mood and affect    Result Review :  The following data was reviewed by: Marcelino Cevallos MD on " 12/06/2023:      Procedures        Assessment and Plan   Diagnoses and all orders for this visit:    1. Allergic rhinitis due to pollen, unspecified seasonality (Primary)  -     fluticasone (FLONASE) 50 MCG/ACT nasal spray; 2 sprays into the nostril(s) as directed by provider Daily.  Dispense: 11.1 mL; Refill: 12    2. Right ear pain        BMI is within normal parameters. No other follow-up for BMI required.  Patient did have some fluid behind her right ear she reports occasional sniffles and watery eyes she does have history of allergic rhinitis we will go and treat with Flonase.      FOLLOW UP  Return in about 6 months (around 6/6/2024).  Patient was given instructions and counseling regarding her condition or for health maintenance advice. Please see specific information pulled into the AVS if appropriate.       Marcelino Cevallos MD  12/06/23  15:11 EST    CURRENT & DISCONTINUED MEDICATIONS  Current Outpatient Medications   Medication Instructions    fluticasone (FLONASE) 50 MCG/ACT nasal spray 2 sprays, Nasal, Daily    norethindrone-ethinyl estradiol FE (Junel FE 1/20) 1-20 MG-MCG per tablet 1 tablet, Oral, Daily       Medications Discontinued During This Encounter   Medication Reason    methylPREDNISolone (MEDROL) 4 MG dose pack     tiZANidine (ZANAFLEX) 4 MG tablet

## 2023-12-15 ENCOUNTER — PATIENT ROUNDING (BHMG ONLY) (OUTPATIENT)
Dept: FAMILY MEDICINE CLINIC | Facility: CLINIC | Age: 28
End: 2023-12-15
Payer: COMMERCIAL

## 2023-12-15 NOTE — PROGRESS NOTES
December 15, 2023    Hello, may I speak with Orly Hartmann?    My name is Jethro Mcdaniels       I am  with [facility]  [address]  [address]  [place] [address]  [phone].    Before we get started may I verify your date of birth? 1995    I am calling to officially welcome you to our practice and ask about your recent visit. Is this a good time to talk? yes    Tell me about your visit with us. What things went well?  the visit went well        We're always looking for ways to make our patients' experiences even better. Do you have recommendations on ways we may improve?  no    Overall were you satisfied with your first visit to our practice? yes       I appreciate you taking the time to speak with me today. Is there anything else I can do for you? no      Thank you, and have a great day.       nurses notes/old records/vital signs

## 2023-12-26 ENCOUNTER — OFFICE VISIT (OUTPATIENT)
Dept: FAMILY MEDICINE CLINIC | Facility: CLINIC | Age: 28
End: 2023-12-26
Payer: COMMERCIAL

## 2023-12-26 VITALS
DIASTOLIC BLOOD PRESSURE: 62 MMHG | SYSTOLIC BLOOD PRESSURE: 100 MMHG | HEART RATE: 85 BPM | BODY MASS INDEX: 21.88 KG/M2 | OXYGEN SATURATION: 98 % | HEIGHT: 70 IN | TEMPERATURE: 96.9 F | WEIGHT: 152.8 LBS

## 2023-12-26 DIAGNOSIS — J06.9 UPPER RESPIRATORY TRACT INFECTION, UNSPECIFIED TYPE: Primary | ICD-10-CM

## 2023-12-26 LAB
EXPIRATION DATE: NORMAL
EXPIRATION DATE: NORMAL
FLUAV AG UPPER RESP QL IA.RAPID: NOT DETECTED
FLUBV AG UPPER RESP QL IA.RAPID: NOT DETECTED
INTERNAL CONTROL: NORMAL
INTERNAL CONTROL: NORMAL
Lab: 7917
Lab: NORMAL
S PYO AG THROAT QL: NEGATIVE
SARS-COV-2 AG UPPER RESP QL IA.RAPID: NOT DETECTED

## 2023-12-26 PROCEDURE — 99214 OFFICE O/P EST MOD 30 MIN: CPT | Performed by: NURSE PRACTITIONER

## 2023-12-26 PROCEDURE — 87880 STREP A ASSAY W/OPTIC: CPT | Performed by: NURSE PRACTITIONER

## 2023-12-26 PROCEDURE — 87428 SARSCOV & INF VIR A&B AG IA: CPT | Performed by: NURSE PRACTITIONER

## 2023-12-26 RX ORDER — AZITHROMYCIN 250 MG/1
TABLET, FILM COATED ORAL
Qty: 6 TABLET | Refills: 0 | Status: SHIPPED | OUTPATIENT
Start: 2023-12-26

## 2023-12-26 NOTE — PROGRESS NOTES
"Chief Complaint  Earache, Nasal Congestion, and Cough    Subjective        Medical History: has no past medical history on file.     Surgical History: has a past surgical history that includes Cystectomy.     Family History: family history is not on file.     Social History: reports that she has never smoked. She has never used smokeless tobacco. She reports that she does not currently use alcohol. She reports that she does not use drugs.    Orly Hartmann presents to [facility]  Earache   There is pain in both ears. This is a new problem. The current episode started 1 to 4 weeks ago. The problem has been waxing and waning. There has been no fever. Associated symptoms include coughing, rhinorrhea and a sore throat. Pertinent negatives include no ear discharge, headaches or hearing loss. She has tried nothing for the symptoms. The treatment provided no relief.   Cough  This is a new problem. The current episode started 1 to 4 weeks ago. The problem has been coming and going. Associated symptoms include ear pain, nasal congestion, postnasal drip, rhinorrhea and a sore throat. Pertinent negatives include no fever, headaches or shortness of breath. Nothing aggravates the symptoms.       Objective   Vital Signs:   /62   Pulse 85   Temp 96.9 °F (36.1 °C)   Ht 177.8 cm (70\")   Wt 69.3 kg (152 lb 12.8 oz)   SpO2 98%   BMI 21.92 kg/m²       Wt Readings from Last 3 Encounters:   12/26/23 69.3 kg (152 lb 12.8 oz)   12/06/23 68.5 kg (151 lb)   07/05/23 67.6 kg (149 lb)        BP Readings from Last 3 Encounters:   12/26/23 100/62   12/06/23 98/60   07/05/23 101/65        BMI is within normal parameters. No other follow-up for BMI required.       Physical Exam  Vitals reviewed.   Constitutional:       Appearance: Normal appearance. She is well-developed.   HENT:      Head: Normocephalic and atraumatic.      Right Ear: Tympanic membrane normal.      Left Ear: Tympanic membrane normal. "      Nose: Congestion and rhinorrhea present.      Mouth/Throat:      Mouth: Mucous membranes are moist.      Comments: Clear postnasal drip  Eyes:      Conjunctiva/sclera: Conjunctivae normal.      Pupils: Pupils are equal, round, and reactive to light.   Cardiovascular:      Rate and Rhythm: Normal rate and regular rhythm.      Heart sounds: No murmur heard.  Pulmonary:      Effort: Pulmonary effort is normal.      Breath sounds: Normal breath sounds. No wheezing or rhonchi.   Skin:     General: Skin is warm and dry.   Neurological:      Mental Status: She is alert and oriented to person, place, and time.   Psychiatric:         Mood and Affect: Mood and affect normal.         Behavior: Behavior normal.         Thought Content: Thought content normal.         Judgment: Judgment normal.        Result Review :  {The following data was reviewed by JUAN Santana on 12/26/2023.              Component  Ref Range & Units 13:24  (12/26/23) 13:23  (12/26/23) 5 mo ago  (7/5/23) 5 mo ago  (7/5/23) 5 mo ago  (7/5/23) 9 mo ago  (3/23/23) 9 mo ago  (3/23/23) 9 mo ago  (3/23/23)   Rapid Strep A Screen  Negative, VALID, INVALID, Not Performed Negative    Negative R   Negative   Internal Control  Passed Passed Passed Passed Passed Passed R Passed Passed Passed   Lot Number [id number],[id number],[id number],[id number]   Expiration Date 07/14/2025 11/10/2024 102,924 100,223 113,024                  Component  Ref Range & Units 13:23  (12/26/23) 5 mo ago  (7/5/23) 5 mo ago  (7/5/23) 5 mo ago  (7/5/23) 9 mo ago  (3/23/23) 9 mo ago  (3/23/23) 9 mo ago  (3/23/23)   SARS Antigen  Not Detected, Presumptive Negative Not Detected  Not Detected  Not Detected     Influenza A Antigen GEORGINA  Not Detected Not Detected         Influenza B Antigen GEORGINA  Not Detected Not Detected         Internal Control  Passed Passed Passed Passed Passed R Passed Passed Passed   Lot Number [id number] 708,[id number],[id number]  [phone],[id number]   Expiration Date 11/10/2024 102924 100,223 113,024 10/2/23 11/8/24 6/30/24   Resulting Agency  [facility]          Current Outpatient Medications on File Prior to Visit   Medication Sig Dispense Refill    fluticasone (FLONASE) 50 MCG/ACT nasal spray 2 sprays into the nostril(s) as directed by provider Daily. 11.1 mL 12    norethindrone-ethinyl estradiol FE (Junel FE 1/20) 1-20 MG-MCG per tablet Take 1 tablet by mouth Daily. 84 tablet 3     No current facility-administered medications on file prior to visit.        Assessment and Plan  Diagnoses and all orders for this visit:    1. Upper respiratory tract infection, unspecified type (Primary)  -     POCT SARS-CoV-2 Antigen GEORGINA + Flu  -     POCT rapid strep A    Other orders  -     azithromycin (Zithromax Z-Mane) 250 MG tablet; Take 2 tablets by mouth on day 1, then 1 tablet daily on days 2-5  Dispense: 6 tablet; Refill: 0    Patient has leftover Bromfed from, encourage patient to continue to take the Bromfed.  The Bromfed is helping with cough and congestion.  Likely sore throat is related to the postnasal drip, discussed using Flonase.  Will send over Zithromax x 6 days since symptoms have been persistent for nearly 3 weeks.  Patient verbalized understanding agreeable treatment plan.      Follow Up   No follow-ups on file.  Patient was given instructions and counseling regarding her condition or for health maintenance advice. Please see specific information pulled into the AVS if appropriate.       Part of this note may be electronic transcription/translation of spoken language to printed text using the Dragon dictation system

## 2024-03-18 NOTE — TELEPHONE ENCOUNTER
Caller: Orly Hartmann    Relationship: Self    Best call back number: [phone]     Requested Prescriptions:   Requested Prescriptions     Pending Prescriptions Disp Refills    norethindrone-ethinyl estradiol FE (Junel FE 1/20) 1-20 MG-MCG per tablet 84 tablet 3     Sig: Take 1 tablet by mouth Daily.        Pharmacy where request should be sent: [facility] - [place], [place] - [address] [facility] - [phone]  - [phone] FX     Last office visit with prescribing clinician: 12/6/2023   Last telemedicine visit with prescribing clinician: Visit date not found   Next office visit with prescribing clinician: Visit date not found     Does the patient have less than a 3 day supply:  [] Yes  [x] No    Tab Rose Rep   03/18/24 10:36 EDT

## 2024-03-20 RX ORDER — NORETHINDRONE ACETATE AND ETHINYL ESTRADIOL 1MG-20(21)
1 KIT ORAL DAILY
Qty: 84 TABLET | Refills: 3 | Status: SHIPPED | OUTPATIENT
Start: 2024-03-20

## 2024-09-13 ENCOUNTER — OFFICE VISIT (OUTPATIENT)
Dept: FAMILY MEDICINE CLINIC | Facility: CLINIC | Age: 29
End: 2024-09-13
Payer: COMMERCIAL

## 2024-09-13 VITALS
HEART RATE: 88 BPM | TEMPERATURE: 98.9 F | DIASTOLIC BLOOD PRESSURE: 60 MMHG | BODY MASS INDEX: 22.71 KG/M2 | HEIGHT: 69 IN | OXYGEN SATURATION: 98 % | SYSTOLIC BLOOD PRESSURE: 86 MMHG | WEIGHT: 153.3 LBS

## 2024-09-13 DIAGNOSIS — F40.10 SOCIAL ANXIETY DISORDER: ICD-10-CM

## 2024-09-13 DIAGNOSIS — J06.9 UPPER RESPIRATORY TRACT INFECTION, UNSPECIFIED TYPE: Primary | ICD-10-CM

## 2024-09-13 DIAGNOSIS — F33.0 MAJOR DEPRESSIVE DISORDER, RECURRENT, MILD: ICD-10-CM

## 2024-09-13 LAB
EXPIRATION DATE: NORMAL
FLUAV AG UPPER RESP QL IA.RAPID: NOT DETECTED
FLUBV AG UPPER RESP QL IA.RAPID: NOT DETECTED
INTERNAL CONTROL: NORMAL
Lab: NORMAL
SARS-COV-2 AG UPPER RESP QL IA.RAPID: NOT DETECTED

## 2024-09-13 PROCEDURE — 87428 SARSCOV & INF VIR A&B AG IA: CPT | Performed by: STUDENT IN AN ORGANIZED HEALTH CARE EDUCATION/TRAINING PROGRAM

## 2024-09-13 PROCEDURE — 99213 OFFICE O/P EST LOW 20 MIN: CPT | Performed by: STUDENT IN AN ORGANIZED HEALTH CARE EDUCATION/TRAINING PROGRAM

## 2024-09-13 RX ORDER — BENZONATATE 100 MG/1
100 CAPSULE ORAL 3 TIMES DAILY PRN
Qty: 15 CAPSULE | Refills: 0 | Status: SHIPPED | OUTPATIENT
Start: 2024-09-13 | End: 2024-09-18

## 2024-09-13 RX ORDER — ACETAMINOPHEN 325 MG/1
650 TABLET ORAL EVERY 6 HOURS PRN
COMMUNITY

## 2024-09-13 NOTE — PROGRESS NOTES
"Chief Complaint  Fatigue, Fever, and Cough (Muscles sore, aches x 4 days)    Subjective      Orly Hartmann is a 28 y.o. female who presents to [facility]     Upper respiratory infection  - about 4 days ago noticed fever, fatigue, myalgias, cough  - temp as high as 100 at home noted  - did take Dimetap which helped  - has been taking tylenol as well  - has multiple sick contacts at home  - did home COVID test which was neg  - no runny nose or congestion  - no diarrhea or nausea  - took of work the last two days    Depression  - has had decreased mood recently  - just broke up with a boyfriend and now feeling down  -Notes decrease motivation to do things  - Has felt this way before when she flunked a class in college  - Denies any SI at this time although PHQ-9 notes that she felt better off dead or hurting herself for several days in the last week as well as wishing to be dead in the last month.  - Has spoken with a therapist in the past for social anxiety disorder and this was very helpful.    Objective   Vital Signs:   Vitals:    09/13/24 1107   BP: (!) 86/60   BP Location: Right arm   Patient Position: Sitting   Pulse: 88   Temp: 98.9 °F (37.2 °C)   TempSrc: Oral   SpO2: 98%   Weight: 69.5 kg (153 lb 4.8 oz)   Height: 175.3 cm (69\")     Body mass index is 22.64 kg/m².    Wt Readings from Last 3 Encounters:   09/13/24 69.5 kg (153 lb 4.8 oz)   12/26/23 69.3 kg (152 lb 12.8 oz)   12/06/23 68.5 kg (151 lb)     BP Readings from Last 3 Encounters:   09/13/24 (!) 86/60   12/26/23 100/62   12/06/23 98/60       Health Maintenance   Topic Date Due    PAP SMEAR  Never done    ANNUAL PHYSICAL  04/25/2023    COVID-19 Vaccine (2 - 2023-24 season) 09/01/2024    INFLUENZA VACCINE  08/01/2024    TDAP/TD VACCINES (2 - Td or Tdap) 02/08/2032    HEPATITIS C SCREENING  Completed    Pneumococcal Vaccine 0-64  Aged Out    HPV VACCINES  Discontinued       Physical Exam  Constitutional:       General: " She is not in acute distress.     Appearance: Normal appearance.   HENT:      Head: Normocephalic and atraumatic.      Right Ear: Tympanic membrane and ear canal normal.      Left Ear: Tympanic membrane and ear canal normal.      Mouth/Throat:      Mouth: Mucous membranes are moist.      Pharynx: No oropharyngeal exudate or posterior oropharyngeal erythema.   Eyes:      General:         Right eye: No discharge.         Left eye: No discharge.      Extraocular Movements: Extraocular movements intact.   Cardiovascular:      Rate and Rhythm: Normal rate and regular rhythm.      Heart sounds: No murmur heard.  Pulmonary:      Effort: No respiratory distress.      Breath sounds: No wheezing, rhonchi or rales.   Abdominal:      General: Abdomen is flat.   Musculoskeletal:      Cervical back: Neck supple.   Skin:     General: Skin is warm and dry.   Neurological:      General: No focal deficit present.      Mental Status: She is alert and oriented to person, place, and time. Mental status is at baseline.   Psychiatric:         Mood and Affect: Mood normal.         Thought Content: Thought content normal.         Judgment: Judgment normal.         Result Review :  The following data was reviewed by: Carlos Purcell DO on 09/13/2024:    No Images in the past 120 days found..     Procedures          Diagnoses and all orders for this visit:    1. Upper respiratory tract infection, unspecified type (Primary)  -     benzonatate (Tessalon Perles) 100 MG capsule; Take 1 capsule by mouth 3 (Three) Times a Day As Needed for Cough for up to 5 days.  Dispense: 15 capsule; Refill: 0    2. Major depressive disorder, recurrent, mild    3. Social anxiety disorder       Patient is negative for flu and for COVID.  Discussed with patient to continue to wear mask in public.  Will give patient a note to be off work until 9/17/2024.  Advised patient to continue to drink lots of fluids as patient's blood pressure is slightly low today.  Also  prescribed Tessalon Perles the patient may take as needed for cough.  Patient is willing to see a therapist again.  Gave patient a list of therapists in the area she may call and set up an appointment with.  Follow-up in 3 to 6 months with Dr. Cevallos both for annual physical as well as depression follow-up.    BMI is within normal parameters. No other follow-up for BMI required.         FOLLOW UP  Return in about 3 months (around 12/13/2024) for depression, with Dr Cevallos.  Patient was given instructions and counseling regarding her condition or for health maintenance advice. Please see specific information pulled into the AVS if appropriate.       Carlos Purcell DO  09/13/24  11:45 EDT    CURRENT & DISCONTINUED MEDICATIONS  Current Outpatient Medications   Medication Instructions    acetaminophen (TYLENOL) 650 mg, Oral, Every 6 Hours PRN    benzonatate (TESSALON PERLES) 100 mg, Oral, 3 Times Daily PRN    norethindrone-ethinyl estradiol FE (Junel FE 1/20) 1-20 MG-MCG per tablet 1 tablet, Oral, Daily       Medications Discontinued During This Encounter   Medication Reason    azithromycin (Zithromax Z-Mane) 250 MG tablet *Therapy completed    fluticasone (FLONASE) 50 MCG/ACT nasal spray *Therapy completed

## 2024-09-13 NOTE — LETTER
September 13, 2024     Patient: Orly Hartmann   YOB: 1995   Date of Visit: 9/13/2024       To Whom It May Concern:    It is my medical opinion that Orly Hatrmann may return to work on 9/17/24. Please excuse Orly from work due to a medical condition.            Sincerely,        Carlos Purcell DO    CC: No Recipients

## 2024-11-19 ENCOUNTER — OFFICE VISIT (OUTPATIENT)
Dept: FAMILY MEDICINE CLINIC | Facility: CLINIC | Age: 29
End: 2024-11-19
Payer: COMMERCIAL

## 2024-11-19 VITALS
HEIGHT: 69 IN | HEART RATE: 98 BPM | BODY MASS INDEX: 22.66 KG/M2 | WEIGHT: 153 LBS | TEMPERATURE: 98.3 F | SYSTOLIC BLOOD PRESSURE: 96 MMHG | DIASTOLIC BLOOD PRESSURE: 66 MMHG | OXYGEN SATURATION: 98 %

## 2024-11-19 DIAGNOSIS — J00 HEAD COLD: ICD-10-CM

## 2024-11-19 DIAGNOSIS — J01.00 ACUTE MAXILLARY SINUSITIS, RECURRENCE NOT SPECIFIED: ICD-10-CM

## 2024-11-19 DIAGNOSIS — J06.9 UPPER RESPIRATORY TRACT INFECTION, UNSPECIFIED TYPE: Primary | ICD-10-CM

## 2024-11-19 PROCEDURE — 99213 OFFICE O/P EST LOW 20 MIN: CPT | Performed by: STUDENT IN AN ORGANIZED HEALTH CARE EDUCATION/TRAINING PROGRAM

## 2024-11-19 PROCEDURE — 87428 SARSCOV & INF VIR A&B AG IA: CPT | Performed by: STUDENT IN AN ORGANIZED HEALTH CARE EDUCATION/TRAINING PROGRAM

## 2024-11-19 RX ORDER — BROMPHENIRAMINE MALEATE, PSEUDOEPHEDRINE HYDROCHLORIDE, AND DEXTROMETHORPHAN HYDROBROMIDE 2; 30; 10 MG/5ML; MG/5ML; MG/5ML
10 SYRUP ORAL 3 TIMES DAILY PRN
Qty: 120 ML | Refills: 0 | Status: SHIPPED | OUTPATIENT
Start: 2024-11-19

## 2024-11-19 RX ORDER — DOXYCYCLINE 100 MG/1
100 CAPSULE ORAL 2 TIMES DAILY
Qty: 10 CAPSULE | Refills: 0 | Status: SHIPPED | OUTPATIENT
Start: 2024-11-19

## 2024-11-19 NOTE — LETTER
November 19, 2024     Patient: Orly Hartmann   YOB: 1995   Date of Visit: 11/19/2024       To Whom It May Concern:    It is my medical opinion that Orly Hartmann may return to work on 11/22/24. Please excuse her from 11/18/24 until 11/22/24 due to a medical illness.         Sincerely,        Carlos Purcell,     CC: No Recipients

## 2024-11-19 NOTE — PROGRESS NOTES
Chief Complaint  Cough (States very tired. Went to urgent care November 15th medicine did help some.)    Subjective      Orly Hartmann is a 28 y.o. female who presents to [facility]     History of Present Illness  The patient is a 28-year-old female who presents for evaluation of a sore throat and fatigue.    She has been experiencing a sore throat and fatigue for approximately a week. She sought care at an urgent care center where she was prescribed Bromfed cough syrup and fluticasone nasal spray. Her supply of Bromfed is nearly depleted. She finds the cough syrup to be more effective than the nasal spray. She reports a mild runny nose, which she believes is well-managed by the nasal spray. She experiences mild congestion but reports no sinus pain. She began coughing last Thursday, which slightly improved on Friday, but has since worsened. Her cough is most severe in the morning and does not produce much mucus. She reports no vomiting associated with the cough, but notes that it causes discomfort. Her most bothersome symptoms are the cough and an itchy throat when speaking.     Her symptoms initially improved but have since worsened, with increased fatigue and congestion. She took a day off work last Friday due to her symptoms. She experienced a sensation of fullness in her ears this morning, which improved after coughing and blowing her nose. She noticed a small amount of yellow-greenish mucus when cleaning her nose a few days ago.     She is currently on Junel birth control pills, which she takes around 10 or 11 at night. She has previously taken Z-Mane without any adverse effects. She has not had a fever, with her temperature remaining around 98 degrees. She reports no shortness of breath.    ALLERGIES  She is allergic to PENICILLIN and BACTRIM.       Objective   Vital Signs:   Vitals:    11/19/24 0933   BP: 96/66   BP Location: Left arm   Patient Position: Sitting   Pulse: 98  "  Temp: 98.3 °F (36.8 °C)   TempSrc: Oral   SpO2: 98%   Weight: 69.4 kg (153 lb)   Height: 175.3 cm (69\")     Body mass index is 22.59 kg/m².    Wt Readings from Last 3 Encounters:   11/19/24 69.4 kg (153 lb)   11/15/24 69.4 kg (153 lb)   09/13/24 69.5 kg (153 lb 4.8 oz)     BP Readings from Last 3 Encounters:   11/19/24 96/66   11/15/24 107/65   09/13/24 (!) 86/60       Health Maintenance   Topic Date Due    PAP SMEAR  Never done    ANNUAL PHYSICAL  04/25/2023    COVID-19 Vaccine (2 - 2024-25 season) 09/01/2024    TDAP/TD VACCINES (2 - Td or Tdap) 02/08/2032    HEPATITIS C SCREENING  Completed    INFLUENZA VACCINE  Completed    Pneumococcal Vaccine 0-64  Aged Out    HPV VACCINES  Discontinued       Physical Exam  Constitutional:       General: She is not in acute distress.     Appearance: Normal appearance.   HENT:      Head: Normocephalic and atraumatic.      Right Ear: Tympanic membrane and ear canal normal. There is no impacted cerumen.      Left Ear: Tympanic membrane and ear canal normal. There is no impacted cerumen.      Nose: Congestion present. No rhinorrhea.      Comments: Pain to palpation of the left frontal sinus as well as the right and left maxillary sinuses     Mouth/Throat:      Mouth: Mucous membranes are moist.   Eyes:      Extraocular Movements: Extraocular movements intact.   Cardiovascular:      Rate and Rhythm: Normal rate and regular rhythm.      Heart sounds: No murmur heard.  Pulmonary:      Effort: No respiratory distress.      Breath sounds: No wheezing, rhonchi or rales.   Abdominal:      General: Abdomen is flat.   Musculoskeletal:      Cervical back: Neck supple.   Skin:     General: Skin is warm and dry.   Neurological:      General: No focal deficit present.      Mental Status: She is alert and oriented to person, place, and time. Mental status is at baseline.   Psychiatric:         Mood and Affect: Mood normal.         Thought Content: Thought content normal.         Judgment: " Judgment normal.          Physical Exam      Result Review :  The following data was reviewed by: Carlos Purcell DO on 11/19/2024:    No Images in the past 120 days found..     Results  Laboratory Studies  Covid test: Negative. Flu test: Negative. Strep test: Negative.       Procedures          Diagnoses and all orders for this visit:    1. Upper respiratory tract infection, unspecified type (Primary)  -     brompheniramine-pseudoephedrine-DM 30-2-10 MG/5ML syrup; Take 10 mL by mouth 3 (Three) Times a Day As Needed for Congestion or Cough.  Dispense: 120 mL; Refill: 0  -     POCT SARS-CoV-2 + Flu Antigen GEORGINA    2. Head cold    3. Acute maxillary sinusitis, recurrence not specified  -     doxycycline (VIBRAMYCIN) 100 MG capsule; Take 1 capsule by mouth 2 (Two) Times a Day.  Dispense: 10 capsule; Refill: 0         Assessment & Plan  1. Suspected sinus infection.  Despite negative results for COVID-19, influenza, and strep tests, her symptoms suggest a possible sinus infection due to the initial decrease of symptoms and then worsening of symptoms the last several days. The absence of lung involvement is a positive sign. A prescription for doxycycline, to be taken twice daily for 5 days, has been provided. She is advised to commence the antibiotic only if her condition does not improve within the next 24 hours. She should continue using the nasal spray and take Tylenol as needed for pain relief. A saline nasal spray can be used to alleviate congestion. The antibiotic should be taken with food and she should avoid lying down for at least 30 minutes post-administration. It is recommended that she takes the antibiotic a few hours before her birth control pill. A work note has been provided, excusing her from work until 11/22/2024.    2. Cough.  She reports a significant cough that worsens in the morning and is described as a tickle in her throat. A refill of Bromfed cough syrup has been provided.      BMI is within normal  parameters. No other follow-up for BMI required.         FOLLOW UP  Return for Next scheduled follow up.  Patient was given instructions and counseling regarding her condition or for health maintenance advice. Please see specific information pulled into the AVS if appropriate.     Patient or patient representative verbalized consent for the use of Ambient Listening during the visit with  Carlos Purcell DO for chart documentation. 11/19/2024  11:57 EST    Carlos Purcell DO  11/19/24  11:57 EST    CURRENT & DISCONTINUED MEDICATIONS  Current Outpatient Medications   Medication Instructions    acetaminophen (TYLENOL) 650 mg, Every 6 Hours PRN    brompheniramine-pseudoephedrine-DM 30-2-10 MG/5ML syrup 10 mL, Oral, 3 Times Daily PRN    doxycycline (VIBRAMYCIN) 100 mg, Oral, 2 Times Daily    fluticasone (FLONASE) 50 MCG/ACT nasal spray 2 sprays, Nasal, Daily, 2 puffs each nostril    norethindrone-ethinyl estradiol FE (Junel FE 1/20) 1-20 MG-MCG per tablet 1 tablet, Oral, Daily       Medications Discontinued During This Encounter   Medication Reason    brompheniramine-pseudoephedrine-DM 30-2-10 MG/5ML syrup Reorder

## 2025-02-25 DIAGNOSIS — N94.6 DYSMENORRHEA: ICD-10-CM

## 2025-02-25 DIAGNOSIS — Z83.49 FAMILY HISTORY OF THYROID DISEASE IN FATHER: Primary | ICD-10-CM

## 2025-02-25 RX ORDER — NORETHINDRONE ACETATE AND ETHINYL ESTRADIOL 1MG-20(21)
1 KIT ORAL DAILY
Qty: 84 TABLET | Refills: 3 | Status: SHIPPED | OUTPATIENT
Start: 2025-02-25

## 2025-03-11 ENCOUNTER — HOSPITAL ENCOUNTER (OUTPATIENT)
Dept: GENERAL RADIOLOGY | Facility: HOSPITAL | Age: 30
Discharge: HOME | End: 2025-03-11
Admitting: STUDENT IN AN ORGANIZED HEALTH CARE EDUCATION/TRAINING PROGRAM
Payer: COMMERCIAL

## 2025-03-11 ENCOUNTER — OFFICE VISIT (OUTPATIENT)
Dept: FAMILY MEDICINE CLINIC | Facility: CLINIC | Age: 30
End: 2025-03-11
Payer: COMMERCIAL

## 2025-03-11 VITALS
HEIGHT: 70 IN | HEART RATE: 95 BPM | BODY MASS INDEX: 22.33 KG/M2 | OXYGEN SATURATION: 98 % | DIASTOLIC BLOOD PRESSURE: 70 MMHG | WEIGHT: 156 LBS | SYSTOLIC BLOOD PRESSURE: 100 MMHG | TEMPERATURE: 97.5 F

## 2025-03-11 DIAGNOSIS — M25.512 LEFT SHOULDER PAIN, UNSPECIFIED CHRONICITY: ICD-10-CM

## 2025-03-11 DIAGNOSIS — J01.10 ACUTE NON-RECURRENT FRONTAL SINUSITIS: ICD-10-CM

## 2025-03-11 DIAGNOSIS — Z00.00 ANNUAL PHYSICAL EXAM: Primary | ICD-10-CM

## 2025-03-11 DIAGNOSIS — N94.6 DYSMENORRHEA: ICD-10-CM

## 2025-03-11 DIAGNOSIS — H93.8X3 EAR FULLNESS, BILATERAL: ICD-10-CM

## 2025-03-11 DIAGNOSIS — M54.50 MIDLINE LOW BACK PAIN, UNSPECIFIED CHRONICITY, UNSPECIFIED WHETHER SCIATICA PRESENT: ICD-10-CM

## 2025-03-11 DIAGNOSIS — F41.1 GAD (GENERALIZED ANXIETY DISORDER): ICD-10-CM

## 2025-03-11 LAB
ALBUMIN SERPL-MCNC: 4.3 G/DL (ref 3.5–5.2)
ALBUMIN/GLOB SERPL: 1.5 G/DL
ALP SERPL-CCNC: 44 U/L (ref 39–117)
ALT SERPL W P-5'-P-CCNC: 15 U/L (ref 1–33)
ANION GAP SERPL CALCULATED.3IONS-SCNC: 11 MMOL/L (ref 5–15)
AST SERPL-CCNC: 23 U/L (ref 1–32)
BASOPHILS # BLD AUTO: 0.03 10*3/MM3 (ref 0–0.2)
BASOPHILS NFR BLD AUTO: 0.5 % (ref 0–1.5)
BILIRUB SERPL-MCNC: 0.3 MG/DL (ref 0–1.2)
BUN SERPL-MCNC: 8 MG/DL (ref 6–20)
BUN/CREAT SERPL: 11.8 (ref 7–25)
CALCIUM SPEC-SCNC: 9 MG/DL (ref 8.6–10.5)
CHLORIDE SERPL-SCNC: 106 MMOL/L (ref 98–107)
CHOLEST SERPL-MCNC: 217 MG/DL (ref 0–200)
CO2 SERPL-SCNC: 23 MMOL/L (ref 22–29)
CREAT SERPL-MCNC: 0.68 MG/DL (ref 0.57–1)
DEPRECATED RDW RBC AUTO: 39.4 FL (ref 37–54)
EGFRCR SERPLBLD CKD-EPI 2021: 122.6 ML/MIN/1.73
EOSINOPHIL # BLD AUTO: 0.07 10*3/MM3 (ref 0–0.4)
EOSINOPHIL NFR BLD AUTO: 1.2 % (ref 0.3–6.2)
ERYTHROCYTE [DISTWIDTH] IN BLOOD BY AUTOMATED COUNT: 11.8 % (ref 12.3–15.4)
GLOBULIN UR ELPH-MCNC: 2.9 GM/DL
GLUCOSE SERPL-MCNC: 72 MG/DL (ref 65–99)
HCT VFR BLD AUTO: 43.4 % (ref 34–46.6)
HDLC SERPL-MCNC: 39 MG/DL (ref 40–60)
HGB BLD-MCNC: 14.1 G/DL (ref 12–15.9)
IMM GRANULOCYTES # BLD AUTO: 0 10*3/MM3 (ref 0–0.05)
IMM GRANULOCYTES NFR BLD AUTO: 0 % (ref 0–0.5)
LDLC SERPL CALC-MCNC: 151 MG/DL (ref 0–100)
LDLC/HDLC SERPL: 3.8 {RATIO}
LYMPHOCYTES # BLD AUTO: 1.98 10*3/MM3 (ref 0.7–3.1)
LYMPHOCYTES NFR BLD AUTO: 33 % (ref 19.6–45.3)
MCH RBC QN AUTO: 29.6 PG (ref 26.6–33)
MCHC RBC AUTO-ENTMCNC: 32.5 G/DL (ref 31.5–35.7)
MCV RBC AUTO: 91 FL (ref 79–97)
MONOCYTES # BLD AUTO: 0.36 10*3/MM3 (ref 0.1–0.9)
MONOCYTES NFR BLD AUTO: 6 % (ref 5–12)
NEUTROPHILS NFR BLD AUTO: 3.56 10*3/MM3 (ref 1.7–7)
NEUTROPHILS NFR BLD AUTO: 59.3 % (ref 42.7–76)
NRBC BLD AUTO-RTO: 0 /100 WBC (ref 0–0.2)
PLATELET # BLD AUTO: 309 10*3/MM3 (ref 140–450)
PMV BLD AUTO: 9.7 FL (ref 6–12)
POTASSIUM SERPL-SCNC: 4 MMOL/L (ref 3.5–5.2)
PROT SERPL-MCNC: 7.2 G/DL (ref 6–8.5)
RBC # BLD AUTO: 4.77 10*6/MM3 (ref 3.77–5.28)
SODIUM SERPL-SCNC: 140 MMOL/L (ref 136–145)
TRIGL SERPL-MCNC: 149 MG/DL (ref 0–150)
TSH SERPL DL<=0.05 MIU/L-ACNC: 2.5 UIU/ML (ref 0.27–4.2)
VLDLC SERPL-MCNC: 27 MG/DL (ref 5–40)
WBC NRBC COR # BLD AUTO: 6 10*3/MM3 (ref 3.4–10.8)

## 2025-03-11 PROCEDURE — 72110 X-RAY EXAM L-2 SPINE 4/>VWS: CPT

## 2025-03-11 PROCEDURE — 99395 PREV VISIT EST AGE 18-39: CPT | Performed by: STUDENT IN AN ORGANIZED HEALTH CARE EDUCATION/TRAINING PROGRAM

## 2025-03-11 PROCEDURE — 85025 COMPLETE CBC W/AUTO DIFF WBC: CPT | Performed by: STUDENT IN AN ORGANIZED HEALTH CARE EDUCATION/TRAINING PROGRAM

## 2025-03-11 PROCEDURE — 36415 COLL VENOUS BLD VENIPUNCTURE: CPT | Performed by: STUDENT IN AN ORGANIZED HEALTH CARE EDUCATION/TRAINING PROGRAM

## 2025-03-11 PROCEDURE — 80053 COMPREHEN METABOLIC PANEL: CPT | Performed by: STUDENT IN AN ORGANIZED HEALTH CARE EDUCATION/TRAINING PROGRAM

## 2025-03-11 PROCEDURE — 84443 ASSAY THYROID STIM HORMONE: CPT | Performed by: STUDENT IN AN ORGANIZED HEALTH CARE EDUCATION/TRAINING PROGRAM

## 2025-03-11 PROCEDURE — 80061 LIPID PANEL: CPT | Performed by: STUDENT IN AN ORGANIZED HEALTH CARE EDUCATION/TRAINING PROGRAM

## 2025-03-11 RX ORDER — AZITHROMYCIN 250 MG/1
TABLET, FILM COATED ORAL
Qty: 6 TABLET | Refills: 0 | Status: SHIPPED | OUTPATIENT
Start: 2025-03-11

## 2025-03-11 RX ORDER — DICLOFENAC SODIUM 75 MG/1
75 TABLET, DELAYED RELEASE ORAL 2 TIMES DAILY PRN
Qty: 60 TABLET | Refills: 5 | Status: SHIPPED | OUTPATIENT
Start: 2025-03-11

## 2025-03-11 RX ORDER — ESCITALOPRAM OXALATE 10 MG/1
10 TABLET ORAL DAILY
Qty: 30 TABLET | Refills: 1 | Status: SHIPPED | OUTPATIENT
Start: 2025-03-11 | End: 2025-05-10

## 2025-03-11 RX ORDER — NORETHINDRONE ACETATE AND ETHINYL ESTRADIOL 1MG-20(21)
1 KIT ORAL DAILY
Qty: 84 TABLET | Refills: 3 | Status: SHIPPED | OUTPATIENT
Start: 2025-03-11

## 2025-03-11 NOTE — PROGRESS NOTES
Chief Complaint  Annual Exam, Ear Fullness, Anxiety, Back Pain (Over a year ), and Shoulder Pain (Over year.)    Subjective          Orly Hartmann presents to [facility]  History of Present Illness      History of Present Illness  The patient presents for an annual exam, anxiety, ear fullness, left shoulder pain, and low back pain.    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 She has not previously been prescribed any medication for this condition.    She reports a sensation of fullness in both ears, with a more pronounced effect on her left ear. This symptom was first noticed last week and is accompanied by a perceived reduction in hearing. She maintains regular ear hygiene and has not experienced any recent nasal discharge, cough, or congestion. However, she did report a mild runny nose this morning. She also mentions a history of seasonal allergies, although these are typically mild, with the exception of one severe episode in the spring.    She is currently experiencing pain in her left shoulder, which she attributes to overuse as she is left-handed. She reports no recent injuries to the area.    She describes her lower back pain as variable, with some days being more tolerable than others. The pain is primarily localized to the back and does not radiate to the thigh.      Current Outpatient Medications   Medication Instructions    azithromycin (ZITHROMAX) 250 MG tablet Take 2 tablets PO the first day, then 1 tablet PO daily for 4 days.    diclofenac (VOLTAREN) 75 mg, Oral, 2 Times Daily PRN    escitalopram (LEXAPRO) 10 mg, Oral, Daily    norethindrone-ethinyl estradiol FE (Junel FE 1/20) 1-20 MG-MCG per tablet 1 tablet, Oral, Daily       The following portions of the patient's history were reviewed and updated as appropriate: allergies, current medications, past family history, past medical history, past  "social history, past surgical history, and problem list.    Objective   Vital Signs:   /70   Pulse 95   Temp 97.5 °F (36.4 °C)   Ht 177.8 cm (70\")   Wt 70.8 kg (156 lb)   SpO2 98%   BMI 22.38 kg/m²     BP Readings from Last 3 Encounters:   03/11/25 100/70   12/27/24 96/58   11/19/24 96/66     Wt Readings from Last 3 Encounters:   03/11/25 70.8 kg (156 lb)   12/27/24 70.8 kg (156 lb)   11/19/24 69.4 kg (153 lb)     BMI is within normal parameters. No other follow-up for BMI required.     Physical Exam  Constitutional:       Appearance: Normal appearance.   HENT:      Head: Normocephalic.      Right Ear: Tympanic membrane normal.      Left Ear: Tympanic membrane normal.      Nose: Nose normal. Congestion and rhinorrhea present.      Right Sinus: Frontal sinus tenderness present.      Left Sinus: Frontal sinus tenderness present.      Mouth/Throat:      Mouth: Mucous membranes are moist.      Pharynx: Oropharynx is clear. Posterior oropharyngeal erythema present. No oropharyngeal exudate.   Eyes:      Extraocular Movements: Extraocular movements intact.      Conjunctiva/sclera: Conjunctivae normal.      Pupils: Pupils are equal, round, and reactive to light.   Cardiovascular:      Rate and Rhythm: Normal rate and regular rhythm.      Heart sounds: No murmur heard.  Pulmonary:      Effort: Pulmonary effort is normal.   Abdominal:      General: Abdomen is flat.   Musculoskeletal:         General: Normal range of motion.      Cervical back: Normal range of motion.   Lymphadenopathy:      Cervical: No cervical adenopathy.   Skin:     General: Skin is warm and dry.   Neurological:      General: No focal deficit present.      Mental Status: She is alert.   Psychiatric:         Mood and Affect: Mood normal.         Thought Content: Thought content normal.          Result Review :   The following data was reviewed by: Marcelino Cevallos MD on 03/11/2025:           Lab Results   Component Value Date    SARSANTIGEN Not " Detected 12/27/2024    COVID19 Not Detected 03/23/2023    RAPFLUA Negative 07/05/2023    RAPFLUB Negative 07/05/2023    FLUAAG Not Detected 12/27/2024    FLU Negative 11/18/2022    FLU Negative 11/18/2022    FLUBAG Not Detected 12/27/2024    RAPSCRN Negative 11/15/2024    STREPAAG Negative 11/18/2022    POCPREGUR Negative 02/28/2023    BILIRUBINUR Negative 02/28/2023       Procedures        Assessment and Plan    Diagnoses and all orders for this visit:    1. Annual physical exam (Primary)    2. Dysmenorrhea  -     norethindrone-ethinyl estradiol FE (Junel FE 1/20) 1-20 MG-MCG per tablet; Take 1 tablet by mouth Daily.  Dispense: 84 tablet; Refill: 3    3. RISHABH (generalized anxiety disorder)  -     escitalopram (Lexapro) 10 MG tablet; Take 1 tablet by mouth Daily for 60 days.  Dispense: 30 tablet; Refill: 1    4. Ear fullness, bilateral    5. Acute non-recurrent frontal sinusitis  -     azithromycin (ZITHROMAX) 250 MG tablet; Take 2 tablets PO the first day, then 1 tablet PO daily for 4 days.  Dispense: 6 tablet; Refill: 0    6. Left shoulder pain, unspecified chronicity  -     diclofenac (VOLTAREN) 75 MG EC tablet; Take 1 tablet by mouth 2 (Two) Times a Day As Needed (pain).  Dispense: 60 tablet; Refill: 5    7. Midline low back pain, unspecified chronicity, unspecified whether sciatica present  -     XR Spine Lumbar 4+ View; Future          Assessment & Plan  1. Anxiety.  A prescription for Lexapro 10 mg has been issued to manage her anxiety symptoms. She is advised to commence the medication today. A follow-up appointment has been scheduled for 1 month from now to monitor her response to the treatment.    2. Sinusitis.  The sensation of ear fullness is likely due to fluid accumulation behind the eardrum, potentially indicative of sinusitis. A 5-day course of Z-Mane has been prescribed to address the sinus infection. The prescription will be sent to [facility].    3. Left shoulder pain.  The pain is likely a  result of overuse, given her left-handedness and increased workload during tax season. Diclofenac has been prescribed for pain management. She is advised against concurrent use of ibuprofen but may take Tylenol if necessary.    4. Low back pain.  Lumbar x-rays have been ordered to further investigate the cause of her back pain. She is advised to schedule the x-rays at her convenience. If the x-rays reveal degenerative disk disease, a referral for physical therapy will be considered.    Follow-up  The patient will follow up in 1 month.      Medications Discontinued During This Encounter   Medication Reason    brompheniramine-pseudoephedrine-DM 30-2-10 MG/5ML syrup     norethindrone-ethinyl estradiol FE (Junel FE 1/20) 1-20 MG-MCG per tablet Reorder    acetaminophen (TYLENOL) 325 MG tablet           Follow Up   Return in about 4 weeks (around 4/8/2025).  Patient was given instructions and counseling regarding her condition or for health maintenance advice. Please see specific information pulled into the AVS if appropriate.       Marcelino Cevallos MD  03/11/25  10:47 EDT      Patient or patient representative verbalized consent for the use of Ambient Listening during the visit with  Marcelino Cevallos MD for chart documentation. 3/11/2025  10:46 EDT

## 2025-08-01 PROCEDURE — 87081 CULTURE SCREEN ONLY: CPT | Performed by: PHYSICIAN ASSISTANT
